# Patient Record
Sex: FEMALE | Race: WHITE | NOT HISPANIC OR LATINO
[De-identification: names, ages, dates, MRNs, and addresses within clinical notes are randomized per-mention and may not be internally consistent; named-entity substitution may affect disease eponyms.]

---

## 2018-10-25 ENCOUNTER — APPOINTMENT (OUTPATIENT)
Dept: VASCULAR SURGERY | Facility: CLINIC | Age: 53
End: 2018-10-25
Payer: COMMERCIAL

## 2018-10-25 VITALS
WEIGHT: 245 LBS | SYSTOLIC BLOOD PRESSURE: 136 MMHG | HEIGHT: 65 IN | TEMPERATURE: 98.2 F | HEART RATE: 74 BPM | DIASTOLIC BLOOD PRESSURE: 84 MMHG | BODY MASS INDEX: 40.82 KG/M2

## 2018-10-25 VITALS — SYSTOLIC BLOOD PRESSURE: 112 MMHG | HEART RATE: 91 BPM | DIASTOLIC BLOOD PRESSURE: 76 MMHG

## 2018-10-25 PROCEDURE — 93970 EXTREMITY STUDY: CPT

## 2018-10-25 PROCEDURE — 99203 OFFICE O/P NEW LOW 30 MIN: CPT

## 2019-02-07 ENCOUNTER — APPOINTMENT (OUTPATIENT)
Dept: VASCULAR SURGERY | Facility: CLINIC | Age: 54
End: 2019-02-07

## 2019-05-07 ENCOUNTER — APPOINTMENT (OUTPATIENT)
Dept: GASTROENTEROLOGY | Facility: CLINIC | Age: 54
End: 2019-05-07
Payer: COMMERCIAL

## 2019-05-07 VITALS
BODY MASS INDEX: 41.82 KG/M2 | HEIGHT: 65 IN | RESPIRATION RATE: 16 BRPM | OXYGEN SATURATION: 98 % | DIASTOLIC BLOOD PRESSURE: 72 MMHG | HEART RATE: 77 BPM | TEMPERATURE: 98.4 F | SYSTOLIC BLOOD PRESSURE: 110 MMHG | WEIGHT: 251 LBS

## 2019-05-07 PROCEDURE — 99204 OFFICE O/P NEW MOD 45 MIN: CPT

## 2019-05-07 NOTE — HISTORY OF PRESENT ILLNESS
[FreeTextEntry1] : 52 yo obese female pmh asthma presenting for discussion of dysphagia/abdominal pain and CRC Screening.\par \par Dysphagia/Abdominal Pain:\par This has been going on for about 1 year\par Has actually been improving her symptoms\par Has decreased coffee/caffeine amount - which did help\par Dysphagia - occurs about 2x/week at most; down to every two weeks\par This occurs with solids and liquids\par Heartburn - minimal now (previously was daily)\par Regurgitation - at least weekly\par No chest pain\par No food impactions\par No weight loss\par + Epigastric discomfort\par \par Triggers: hot sauce, chocolate, spaghetti\par Caffeine - currently 1 cup tea about 2x/day; rare coffee\par No tobacco\par No etoh\par \par Never had upper endoscopy\par \par Meds: has taken tums (on regular basis)\par Has not tried PPI or H2 blocker\par \par \par CRC Screening\par Never had screening for colonoscopy\par No blood in stool\par No melena\par \par HCV Screening - due for age related screening

## 2019-05-07 NOTE — REVIEW OF SYSTEMS
[Fever] : no fever [Chills] : no chills [Feeling Poorly] : not feeling poorly [Eye Pain] : no eye pain [Nosebleeds] : no nosebleeds [Eyesight Problems] : no eyesight problems [Nasal Discharge] : no nasal discharge [Sore Throat] : no sore throat [Heart Rate Is Fast] : the heart rate was not fast [Chest Pain] : no chest pain [Shortness Of Breath] : no shortness of breath [Palpitations] : no palpitations [Wheezing] : no wheezing [Cough] : no cough [As Noted in HPI] : as noted in HPI [Pelvic Pain] : no pelvic pain [Dysuria] : no dysuria [Incontinence] : no incontinence [Limb Swelling] : limb swelling [Joint Swelling] : no joint swelling [Joint Stiffness] : no joint stiffness [Arthralgias] : no arthralgias [Itching] : no itching [Skin Wound] : no skin wound [Skin Lesions] : no skin lesions [Confused] : no confusion [Dizziness] : no dizziness [Convulsions] : no convulsions [Anxiety] : no anxiety [Depression] : no depression [Muscle Weakness] : no muscle weakness [Easy Bruising] : no tendency for easy bruising [Easy Bleeding] : no tendency for easy bleeding [Feelings Of Weakness] : no feelings of weakness [Swollen Glands In The Neck] : no swollen glands in the neck [Swollen Glands] : no swollen glands

## 2019-05-07 NOTE — PHYSICAL EXAM
[General Appearance - Alert] : alert [General Appearance - Well-Appearing] : healthy appearing [General Appearance - In No Acute Distress] : in no acute distress [Outer Ear] : the ears and nose were normal in appearance [Sclera] : the sclera and conjunctiva were normal [Extraocular Movements] : extraocular movements were intact [Neck Appearance] : the appearance of the neck was normal [Oropharynx] : the oropharynx was normal [Examination Of The Oral Cavity] : the lips and gums were normal [Neck Cervical Mass (___cm)] : no neck mass was observed [Exaggerated Use Of Accessory Muscles For Inspiration] : no accessory muscle use [Respiration, Rhythm And Depth] : normal respiratory rhythm and effort [Auscultation Breath Sounds / Voice Sounds] : lungs were clear to auscultation bilaterally [Heart Sounds] : normal S1 and S2 [Heart Rate And Rhythm] : heart rate was normal and rhythm regular [Murmurs] : no murmurs [Abdomen Soft] : soft [Bowel Sounds] : normal bowel sounds [Edema] : there was no peripheral edema [Abdomen Tenderness] : non-tender [Abdomen Mass (___ Cm)] : no abdominal mass palpated [Cervical Lymph Nodes Enlarged Posterior Bilaterally] : posterior cervical [No CVA Tenderness] : no ~M costovertebral angle tenderness [Cervical Lymph Nodes Enlarged Anterior Bilaterally] : anterior cervical [Supraclavicular Lymph Nodes Enlarged Bilaterally] : supraclavicular [Involuntary Movements] : no involuntary movements were seen [No Spinal Tenderness] : no spinal tenderness [Abnormal Walk] : normal gait [] : no rash [FreeTextEntry1] : aox3 [No Focal Deficits] : no focal deficits [Impaired Insight] : insight and judgment were intact [Affect] : the affect was normal

## 2019-05-07 NOTE — ASSESSMENT
[FreeTextEntry1] : 54 yo obese female pmh asthma presenting for discussion of dysphagia/abdominal pain and CRC Screening.  She appears to have GERD with ? dysphagia.  Will start PPI and pursue endoscopic evaluation.  She has already ahd improvement in decreasing caffeine  She is due for CRC Screening..We had an extended discussion regarding colon cancer screening discussing multiple modalities including FOBT/IHC testing, CT Colonography, and Colonoscopy.  After a full discussion of risks, benefits, and limitations of each potential screening modality, the patient has elected to pursue colonoscopy.  Lastly she is due for age related HCV Screening.\par \par Impression:\par 1) GERD\par 2) Dysphagia\par 3) Avg risk CRC\par 4) Due for HCV screening\par 5) Obesity\par \par Plan\par 1) EGD/Colonoscopy with MoviPrep.  Risks, benefits, and limitations of procedure discussed at length specifically including risk of bleeding, infection, anesthesia complications, missed lesions, and perforation.  Prep instructions reviewed, written instructions provided, and prep ordered.\par 2) Start PPI daily 30 min before breakfast\par 3) Lifestyle modifications for GERD:\par - Weight loss\par - Avoid trigger foods for patient\par - Elevate head of bed\par \par 3) HCV ab\par 4) All discussed at length.  All questions answered.  Plan agreed upon\par 5) RV pending above

## 2019-05-09 LAB
HCV AB SER QL: NONREACTIVE
HCV S/CO RATIO: 0.32 S/CO

## 2019-06-10 ENCOUNTER — OTHER (OUTPATIENT)
Age: 54
End: 2019-06-10

## 2019-06-24 ENCOUNTER — OTHER (OUTPATIENT)
Age: 54
End: 2019-06-24

## 2019-06-26 ENCOUNTER — APPOINTMENT (OUTPATIENT)
Dept: GASTROENTEROLOGY | Facility: HOSPITAL | Age: 54
End: 2019-06-26

## 2019-06-26 ENCOUNTER — OUTPATIENT (OUTPATIENT)
Dept: OUTPATIENT SERVICES | Facility: HOSPITAL | Age: 54
LOS: 1 days | End: 2019-06-26
Payer: COMMERCIAL

## 2019-06-26 ENCOUNTER — RESULT REVIEW (OUTPATIENT)
Age: 54
End: 2019-06-26

## 2019-06-26 DIAGNOSIS — K21.9 GASTRO-ESOPHAGEAL REFLUX DISEASE WITHOUT ESOPHAGITIS: ICD-10-CM

## 2019-06-26 DIAGNOSIS — Z12.11 ENCOUNTER FOR SCREENING FOR MALIGNANT NEOPLASM OF COLON: ICD-10-CM

## 2019-06-26 DIAGNOSIS — R13.10 DYSPHAGIA, UNSPECIFIED: ICD-10-CM

## 2019-06-26 PROCEDURE — 88312 SPECIAL STAINS GROUP 1: CPT | Mod: 26

## 2019-06-26 PROCEDURE — 88305 TISSUE EXAM BY PATHOLOGIST: CPT | Mod: 26

## 2019-06-26 PROCEDURE — 43239 EGD BIOPSY SINGLE/MULTIPLE: CPT

## 2019-06-26 PROCEDURE — 45378 DIAGNOSTIC COLONOSCOPY: CPT

## 2019-06-28 PROCEDURE — 88312 SPECIAL STAINS GROUP 1: CPT

## 2019-06-28 PROCEDURE — 45380 COLONOSCOPY AND BIOPSY: CPT | Mod: PT

## 2019-06-28 PROCEDURE — 88305 TISSUE EXAM BY PATHOLOGIST: CPT

## 2019-06-28 PROCEDURE — 43239 EGD BIOPSY SINGLE/MULTIPLE: CPT

## 2019-07-01 ENCOUNTER — OTHER (OUTPATIENT)
Age: 54
End: 2019-07-01

## 2019-07-24 ENCOUNTER — OTHER (OUTPATIENT)
Age: 54
End: 2019-07-24

## 2019-08-26 ENCOUNTER — OTHER (OUTPATIENT)
Age: 54
End: 2019-08-26

## 2019-08-27 ENCOUNTER — APPOINTMENT (OUTPATIENT)
Dept: GASTROENTEROLOGY | Facility: CLINIC | Age: 54
End: 2019-08-27
Payer: COMMERCIAL

## 2019-08-27 VITALS
TEMPERATURE: 98.5 F | HEIGHT: 64 IN | WEIGHT: 265 LBS | OXYGEN SATURATION: 98 % | SYSTOLIC BLOOD PRESSURE: 126 MMHG | HEART RATE: 93 BPM | DIASTOLIC BLOOD PRESSURE: 80 MMHG | BODY MASS INDEX: 45.24 KG/M2 | RESPIRATION RATE: 17 BRPM

## 2019-08-27 DIAGNOSIS — Z11.59 ENCOUNTER FOR SCREENING FOR OTHER VIRAL DISEASES: ICD-10-CM

## 2019-08-27 DIAGNOSIS — R13.10 DYSPHAGIA, UNSPECIFIED: ICD-10-CM

## 2019-08-27 DIAGNOSIS — Z86.39 PERSONAL HISTORY OF OTHER ENDOCRINE, NUTRITIONAL AND METABOLIC DISEASE: ICD-10-CM

## 2019-08-27 PROCEDURE — 99214 OFFICE O/P EST MOD 30 MIN: CPT

## 2019-08-28 NOTE — REVIEW OF SYSTEMS
[Feeling Poorly] : not feeling poorly [Feeling Tired] : not feeling tired [As Noted in HPI] : as noted in HPI [Limb Pain] : limb pain [Limb Swelling] : no limb swelling [Negative] : Heme/Lymph

## 2019-08-28 NOTE — ASSESSMENT
[FreeTextEntry1] : 55 yo obese female pmh asthma presenting for follow up.  She had a negative colonoscopy and is due in 10 years.  Her dysphagia and GERD have 100% resolved with PPI and she had a negative EGD with bx.  Overall she is doing well currently.  She is considering further interventions for weight loss and wishes a referral to the obesity/weight management center.  We will maintain her on a PPI for an additional 2 months and then begin weening off.  Her Gastric ulcers were superficial and don’t require repeat endoscopy at this time.\par \par Impression:\par 1) GERD - resolved\par 2) Dysphagia - resolved\par 3) Superficial gastric ulcer/erosion - HP negative\par 4) Avg risk for CRC - due 2029\par 5) Obesity\par 6) Sciatica\par \par Plan\par 1) Continue with PPI\par 2) Referral to Obesity/weight management center\par 3) Continue with diet and exercise changes for weight loss\par 4) She needs to establish care with a PCP for her sciatica - multiple names provided\par 5) All discussed at length. All questions answered. Plan agreed upon\par 6) RV pending above.

## 2019-08-28 NOTE — PHYSICAL EXAM
[General Appearance - Alert] : alert [General Appearance - In No Acute Distress] : in no acute distress [General Appearance - Well-Appearing] : healthy appearing [Sclera] : the sclera and conjunctiva were normal [Extraocular Movements] : extraocular movements were intact [Outer Ear] : the ears and nose were normal in appearance [Oropharynx] : the oropharynx was normal [Examination Of The Oral Cavity] : the lips and gums were normal [Neck Appearance] : the appearance of the neck was normal [Neck Cervical Mass (___cm)] : no neck mass was observed [Respiration, Rhythm And Depth] : normal respiratory rhythm and effort [Exaggerated Use Of Accessory Muscles For Inspiration] : no accessory muscle use [Heart Rate And Rhythm] : heart rate was normal and rhythm regular [Auscultation Breath Sounds / Voice Sounds] : lungs were clear to auscultation bilaterally [Murmurs] : no murmurs [Heart Sounds] : normal S1 and S2 [Edema] : there was no peripheral edema [Abdomen Soft] : soft [Bowel Sounds] : normal bowel sounds [Abdomen Tenderness] : non-tender [Abdomen Mass (___ Cm)] : no abdominal mass palpated [Cervical Lymph Nodes Enlarged Posterior Bilaterally] : posterior cervical [Cervical Lymph Nodes Enlarged Anterior Bilaterally] : anterior cervical [Supraclavicular Lymph Nodes Enlarged Bilaterally] : supraclavicular [No CVA Tenderness] : no ~M costovertebral angle tenderness [No Spinal Tenderness] : no spinal tenderness [Abnormal Walk] : normal gait [Involuntary Movements] : no involuntary movements were seen [] : no rash [No Focal Deficits] : no focal deficits [FreeTextEntry1] : aox3 [Impaired Insight] : insight and judgment were intact [Affect] : the affect was normal

## 2019-08-28 NOTE — HISTORY OF PRESENT ILLNESS
[FreeTextEntry1] : Follow up: 6/2019\par Plan after last visit:\par Esophageal dysphagia (787.20) (R13.10)\par Chronic GERD (530.81) (K21.9)\par Morbid obesity (278.01) (E66.01)\par Colon cancer screening (V76.51) (Z12.11)\par Need for hepatitis C screening test (V73.89) (Z11.59)\par \par 52 yo obese female pmh asthma presenting for discussion of dysphagia/abdominal pain and CRC Screening. She appears to have GERD with ? dysphagia. Will start PPI and pursue endoscopic evaluation. She has already had improvement in decreasing caffeine She is due for CRC Screening..We had an extended discussion regarding colon cancer screening discussing multiple modalities including FOBT/IHC testing, CT Colonography, and Colonoscopy. After a full discussion of risks, benefits, and limitations of each potential screening modality, the patient has elected to pursue colonoscopy. Lastly she is due for age related HCV Screening.\par \par Impression:\par 1) GERD\par 2) Dysphagia\par 3) Avg risk CRC\par 4) Due for HCV screening\par 5) Obesity\par \par Plan\par 1) EGD/Colonoscopy with MoviPrep. Risks, benefits, and limitations of procedure discussed at length specifically including risk of bleeding, infection, anesthesia complications, missed lesions, and perforation. Prep instructions reviewed, written instructions provided, and prep ordered.\par 2) Start PPI daily 30 min before breakfast\par 3) Lifestyle modifications for GERD:\par - Weight loss\par - Avoid trigger foods for patient\par - Elevate head of bed\par 3) HCV ab\par 4) All discussed at length. All questions answered. Plan agreed upon\par 5) RV pending above. \par ------------------------------------------------------------------------------\par \par Sciatica - has been acting up - trial of Nsaids and Gabapentins\par \par Dysphagia has fully resolved with PPI\par Abdominal Discomfort resolved\par \par Gastric ulcers - incidentally found, HP negative\par \par HCV - negative ab\par \par CRC Screening - negative, due in 2029\par \par \par -----------------------------------------------------------------------\par HCV Ab - negative\par \par EGD 6/26/2019\par La Class A\par 3 cm HH Hill grade III\par Gastric ulcers\par Bx taken\par Otherwise normal EGD\par \par Colon 6/2019\par Normal - repeat 10 years\par \par \par Pathology:\par Final Diagnosis\par 1. Stomach, endoscopic biopsy:\par - Gastric mucosa with chronic nonspecific gastritis and\par reactive gastropathy\par - Negative for H. pylori (Warthin-Starry stain)\par - Negative for intestinal metaplasia\par \par 2. Esophagus, endoscopic biopsy:\par - Gastroesophageal junctional mucosa with chronic\par nonspecific carditis\par - Negative for intestinal metaplasia\par - Esophageal squamous epithelium without evidence of\par intraepithelial eosinophil\par infiltration\par \par 3. Distal esophagus, endoscopic biopsy:\par - Esophageal squamous epithelium without evidence of\par intraepithelial eosinophil\par infiltration\par - No columnar epithelium present\par \par 4. Proximal/Mid esophagus, endoscopic biopsy:\par - Esophageal squamous epithelium without significant\par pathologic findings\par - No evidence of intraepithelial eosinophil infiltration

## 2019-10-04 ENCOUNTER — OTHER (OUTPATIENT)
Age: 54
End: 2019-10-04

## 2019-12-03 ENCOUNTER — APPOINTMENT (OUTPATIENT)
Dept: GASTROENTEROLOGY | Facility: CLINIC | Age: 54
End: 2019-12-03
Payer: COMMERCIAL

## 2019-12-03 VITALS
SYSTOLIC BLOOD PRESSURE: 140 MMHG | HEIGHT: 64 IN | DIASTOLIC BLOOD PRESSURE: 80 MMHG | TEMPERATURE: 98 F | RESPIRATION RATE: 16 BRPM | OXYGEN SATURATION: 98 % | BODY MASS INDEX: 45.24 KG/M2 | WEIGHT: 265 LBS | HEART RATE: 104 BPM

## 2019-12-03 DIAGNOSIS — Z87.19 PERSONAL HISTORY OF OTHER DISEASES OF THE DIGESTIVE SYSTEM: ICD-10-CM

## 2019-12-03 DIAGNOSIS — Z12.11 ENCOUNTER FOR SCREENING FOR MALIGNANT NEOPLASM OF COLON: ICD-10-CM

## 2019-12-03 PROCEDURE — 99213 OFFICE O/P EST LOW 20 MIN: CPT

## 2019-12-07 PROBLEM — Z87.19 HISTORY OF GASTRIC ULCER: Status: RESOLVED | Noted: 2019-08-28 | Resolved: 2019-12-07

## 2019-12-07 PROBLEM — Z12.11 COLON CANCER SCREENING: Status: RESOLVED | Noted: 2019-05-07 | Resolved: 2019-12-07

## 2019-12-07 NOTE — HISTORY OF PRESENT ILLNESS
[FreeTextEntry1] : Follow up: 8/2019\par Plan after last visit:\par Esophageal dysphagia (787.20) (R13.10)\par Chronic GERD (530.81) (K21.9)\par Colon cancer screening (V76.51) (Z12.11)\par History of obesity (V12.29) (Z86.39)\par Morbid obesity (278.01) (E66.01)\par Gastric ulcer (531.90) (K25.9)\par \par 53 yo obese female pmh asthma presenting for follow up. She had a negative colonoscopy and is due in 10 years. Her dysphagia and GERD have 100% resolved with PPI and she had a negative EGD with bx. Overall she is doing well currently. She is considering further interventions for weight loss and wishes a referral to the obesity/weight management center. We will maintain her on a PPI for an additional 2 months and then begin weening off. Her Gastric ulcers were superficial and don’t require repeat endoscopy at this time.\par \par Impression:\par 1) GERD - resolved\par 2) Dysphagia - resolved\par 3) Superficial gastric ulcer/erosion - HP negative\par 4) Avg risk for CRC - due 2029\par 5) Obesity\par 6) Sciatica\par \par Plan\par 1) Continue with PPI\par 2) Referral to Obesity/weight management center\par 3) Continue with diet and exercise changes for weight loss\par 4) She needs to establish care with a PCP for her sciatica - multiple names provided\par 5) All discussed at length. All questions answered. Plan agreed upon\par 6) RV pending above. \par ------------------------------------------------------------------\par GERD:\par Tried Weening off PPI - however unable to do that\par Felt 'weak and off' as well as 'belching' when came off PPI\par Back on daily PPI - now doing well\par \par Obesity:\par Wishes to potentially work with Dr Owusu at obesity center and/or consider bariatric procedure in future\par Will defer any further referrals at this time per her request

## 2019-12-07 NOTE — ASSESSMENT
[FreeTextEntry1] : 53 yo obese female pmh asthma, GERD presenting for follow up. She was unable to ween off PPI cold turkey and is looking to ween off more gradually.  Will plan for taper at this time.  Ultimately weight loss will be the best method to be able to maintain off medical therapy for her GERD.  She will hold off on referral at this time, but will consider in future.\par \par Impression:\par 1) GERD\par 2) Dysphagia - resolved\par 3) Superficial gastric ulcer/erosion - HP negative\par 4) Avg risk for CRC - due 2029\par 5) Obesity\par 6) Sciatica\par \par Plan\par 1) Continue with PPI.  Then ween down to PPI and H2 blocker alternating every other day.  Then down to H2 blocker daily (+/- 2nd dose); Then down to H2 blocker as needed\par - Reviewed at length with patient and provided written documentation of this plan\par 2) Referral to Obesity/weight management center - if she wishes\par 3) Continue with diet and exercise changes for weight loss\par 4) All discussed at length. All questions answered. Plan agreed upon\par 5) RV 3 months\par 6) Colon 2029

## 2019-12-07 NOTE — PHYSICAL EXAM
[General Appearance - Alert] : alert [General Appearance - Well-Appearing] : healthy appearing [General Appearance - In No Acute Distress] : in no acute distress [Sclera] : the sclera and conjunctiva were normal [Outer Ear] : the ears and nose were normal in appearance [Examination Of The Oral Cavity] : the lips and gums were normal [Extraocular Movements] : extraocular movements were intact [Neck Appearance] : the appearance of the neck was normal [Oropharynx] : the oropharynx was normal [Neck Cervical Mass (___cm)] : no neck mass was observed [Auscultation Breath Sounds / Voice Sounds] : lungs were clear to auscultation bilaterally [Respiration, Rhythm And Depth] : normal respiratory rhythm and effort [Exaggerated Use Of Accessory Muscles For Inspiration] : no accessory muscle use [Heart Sounds] : normal S1 and S2 [Murmurs] : no murmurs [Heart Rate And Rhythm] : heart rate was normal and rhythm regular [Bowel Sounds] : normal bowel sounds [Abdomen Soft] : soft [Edema] : there was no peripheral edema [Abdomen Tenderness] : non-tender [Abdomen Mass (___ Cm)] : no abdominal mass palpated [Abnormal Walk] : normal gait [Involuntary Movements] : no involuntary movements were seen [No Focal Deficits] : no focal deficits [] : no rash [Affect] : the affect was normal [FreeTextEntry1] : aox3 [Impaired Insight] : insight and judgment were intact

## 2020-02-19 ENCOUNTER — APPOINTMENT (OUTPATIENT)
Dept: ORTHOPEDIC SURGERY | Facility: CLINIC | Age: 55
End: 2020-02-19
Payer: COMMERCIAL

## 2020-02-19 VITALS
DIASTOLIC BLOOD PRESSURE: 84 MMHG | SYSTOLIC BLOOD PRESSURE: 139 MMHG | WEIGHT: 260 LBS | BODY MASS INDEX: 44.39 KG/M2 | HEIGHT: 64 IN | HEART RATE: 97 BPM

## 2020-02-19 DIAGNOSIS — M25.552 PAIN IN RIGHT HIP: ICD-10-CM

## 2020-02-19 DIAGNOSIS — Z87.39 PERSONAL HISTORY OF OTHER DISEASES OF THE MUSCULOSKELETAL SYSTEM AND CONNECTIVE TISSUE: ICD-10-CM

## 2020-02-19 DIAGNOSIS — M25.562 PAIN IN RIGHT KNEE: ICD-10-CM

## 2020-02-19 DIAGNOSIS — Z87.09 PERSONAL HISTORY OF OTHER DISEASES OF THE RESPIRATORY SYSTEM: ICD-10-CM

## 2020-02-19 DIAGNOSIS — M25.561 PAIN IN RIGHT KNEE: ICD-10-CM

## 2020-02-19 DIAGNOSIS — M25.551 PAIN IN RIGHT HIP: ICD-10-CM

## 2020-02-19 PROCEDURE — 73564 X-RAY EXAM KNEE 4 OR MORE: CPT | Mod: LT

## 2020-02-19 PROCEDURE — 72170 X-RAY EXAM OF PELVIS: CPT

## 2020-02-19 PROCEDURE — 99214 OFFICE O/P EST MOD 30 MIN: CPT

## 2020-02-19 NOTE — DISCUSSION/SUMMARY
[de-identified] : Patient is advised of her findings and showed her x-rays. She understands she has a arthritic changes specifically to her left knee which are likely causing her disability. Her radiating right sided  pain pattern is noted most likely emanating from her back. She is advised on physical therapy and taking NSAIDs and weight reduction strategies were discussed\par \par Any form of injections were declined by the patient.\par \par Will be reevaluated in 6-8 weeks to check her progress if she remains symptomatic.

## 2020-02-19 NOTE — HISTORY OF PRESENT ILLNESS
[Worsening] : worsening [8] : a maximum pain level of 8/10 [Walking] : walking [Constant] : ~He/She~ states the symptoms seem to be constant [Hip Movement] : worsened by hip movement [Rest] : relieved by rest [___ mths] : [unfilled] month(s) ago [de-identified] : Pt returns for pain in her bilateral legs. Pt is using a topical icy hot for the pain, 7/2019 was seen by her PCP rx'd.  Gabapentin was Pt is taking aleve for pain is helpful. [de-identified] : icy hot

## 2020-02-19 NOTE — PHYSICAL EXAM
[de-identified] : Physical examination discloses medial sided joint line tenderness to the left knee. Her motion is limited to 95° flexion beyond which is painful\par She is noted to have radicular pain radiating from her right buttock across her right hip and lateral thigh [de-identified] : X-rays of the pelvis disclose mild joint space narrowing otherwise nonspecific. Review of priorlumbar films from 2018 disclose multilevel degenerative disc space narrowing\par \par trace of the left knee discloses moderate to severe medial sided joint space narrowing with varus deformity

## 2020-04-29 ENCOUNTER — APPOINTMENT (OUTPATIENT)
Dept: ORTHOPEDIC SURGERY | Facility: CLINIC | Age: 55
End: 2020-04-29

## 2020-06-09 ENCOUNTER — APPOINTMENT (OUTPATIENT)
Dept: GASTROENTEROLOGY | Facility: CLINIC | Age: 55
End: 2020-06-09
Payer: MEDICAID

## 2020-06-09 ENCOUNTER — APPOINTMENT (OUTPATIENT)
Dept: GASTROENTEROLOGY | Facility: CLINIC | Age: 55
End: 2020-06-09

## 2020-06-09 DIAGNOSIS — U07.1 COVID-19: ICD-10-CM

## 2020-06-09 PROCEDURE — 99214 OFFICE O/P EST MOD 30 MIN: CPT | Mod: 95

## 2020-06-09 NOTE — ASSESSMENT
[FreeTextEntry1] : 55 yo obese female pmh asthma, GERD presenting for follow up. Weened down to daily PPI at this time and overall doing okay.  Found to have asymptomatic COVID on PCR and is being managed by PCP currently.  Will leave at current PPI dosing and then move to ween down in about 1 month.  Otherwise no big changes.\par \par \par Impression:\par 1) GERD - weening down\par 2) Dysphagia - resolved\par 3) Superficial gastric ulcer/erosion - HP negative\par 4) Avg risk for CRC - due 2029\par 5) Obesity\par 6) Sciatica\par \par Plan\par 1) Continue with PPI x 4 weeks. Then ween down to PPI and H2 blocker alternating every other day x 1 week. Then down to H2 blocker daily (+/- 2nd dose).  Will regroup at that time\par - Reviewed at length with patient.  She actively wrote down specific dates\par 2) Continue with diet and exercise changes for weight loss\par 3) All discussed at length. All questions answered. Plan agreed upon\par 4) RV 6 weeks\par 5) Colon 2029

## 2020-06-09 NOTE — PHYSICAL EXAM
[General Appearance - Alert] : alert [General Appearance - In No Acute Distress] : in no acute distress [Sclera] : the sclera and conjunctiva were normal [Strabismus] : no strabismus was seen [Examination Of The Oral Cavity] : the lips and gums were normal [Neck Appearance] : the appearance of the neck was normal [Oropharynx] : the oropharynx was normal [Neck Cervical Mass (___cm)] : no neck mass was observed [Respiration, Rhythm And Depth] : normal respiratory rhythm and effort [] : no respiratory distress [Heart Rate And Rhythm] : heart rate was normal and rhythm regular [Exaggerated Use Of Accessory Muscles For Inspiration] : no accessory muscle use [Heart Sounds] : normal S1 and S2 [Bowel Sounds] : normal bowel sounds [Abdomen Tenderness] : non-tender [Abdomen Soft] : soft [No Focal Deficits] : no focal deficits [FreeTextEntry1] : aox3 [Affect] : the affect was normal [Impaired Insight] : insight and judgment were intact

## 2020-06-09 NOTE — HISTORY OF PRESENT ILLNESS
[FreeTextEntry1] : Telehealth Visit:\par Patient located at home in [facility], and Physician located in Office\par Verbal consent obtained from patient after full discussion including limitations of telehealth appointment\par All questions re: telehealth and security answered.  Plan to proceed with telehealth appointment.\par ----------------------------------------------------------------------------------------------\par \par Last visit: 12/2019\par Chronic GERD (530.81) (K21.9)\par Morbid obesity (278.01) (E66.01)\par \par 55 yo obese female pmh asthma, GERD presenting for follow up. She was unable to ween off PPI cold turkey and is looking to ween off more gradually. Will plan for taper at this time. Ultimately weight loss will be the best method to be able to maintain off medical therapy for her GERD. She will hold off on referral at this time, but will consider in future.\par \par Impression:\par 1) GERD\par 2) Dysphagia - resolved\par 3) Superficial gastric ulcer/erosion - HP negative\par 4) Avg risk for CRC - due 2029\par 5) Obesity\par 6) Sciatica\par \par Plan\par 1) Continue with PPI. Then ween down to PPI and H2 blocker alternating every other day. Then down to H2 blocker daily (+/- 2nd dose); Then down to H2 blocker as needed\par - Reviewed at length with patient and provided written documentation of this plan\par 2) Referral to Obesity/weight management center - if she wishes\par 3) Continue with diet and exercise changes for weight loss\par 4) All discussed at length. All questions answered. Plan agreed upon\par 5) RV 3 months\par 6) Colon 2029\par -----------------------------------------------------------------------------\par \par COVID 19 +\par Incidentally found on screening test\par Worsening asthma and sinusitis on time - but otherwise doing okay\par Due for repeat test tomm - self quarantining now\par \par GERD:\par Still on Omeprazole  every day 20 mg\par Tried weening - but with covid, quarantine unable to fully ween off\par Wants to try weening in future\par Denies dysphagia, odynophagia, n/v\par

## 2020-06-18 PROBLEM — U07.1 COVID-19: Status: ACTIVE | Noted: 2020-06-09

## 2020-09-28 ENCOUNTER — RX RENEWAL (OUTPATIENT)
Age: 55
End: 2020-09-28

## 2020-10-28 ENCOUNTER — RX RENEWAL (OUTPATIENT)
Age: 55
End: 2020-10-28

## 2020-11-11 ENCOUNTER — RX RENEWAL (OUTPATIENT)
Age: 55
End: 2020-11-11

## 2021-03-23 ENCOUNTER — APPOINTMENT (OUTPATIENT)
Dept: GASTROENTEROLOGY | Facility: CLINIC | Age: 56
End: 2021-03-23
Payer: MEDICAID

## 2021-03-23 VITALS
HEART RATE: 81 BPM | RESPIRATION RATE: 16 BRPM | BODY MASS INDEX: 43.36 KG/M2 | WEIGHT: 254 LBS | OXYGEN SATURATION: 98 % | TEMPERATURE: 98.1 F | HEIGHT: 64 IN

## 2021-03-23 DIAGNOSIS — K80.20 CALCULUS OF GALLBLADDER W/OUT CHOLECYSTITIS W/OUT OBSTRUCTION: ICD-10-CM

## 2021-03-23 PROCEDURE — 99214 OFFICE O/P EST MOD 30 MIN: CPT

## 2021-03-23 PROCEDURE — 99072 ADDL SUPL MATRL&STAF TM PHE: CPT

## 2021-03-26 ENCOUNTER — RX RENEWAL (OUTPATIENT)
Age: 56
End: 2021-03-26

## 2021-03-26 PROBLEM — K80.20 CHOLELITHIASIS: Status: ACTIVE | Noted: 2021-03-26

## 2021-03-26 NOTE — HISTORY OF PRESENT ILLNESS
[FreeTextEntry1] : Follow up: 6/2020\par Plan after last visit:\par Chronic GERD (530.81) (K21.9)\par Morbid obesity (278.01) (E66.01)\par COVID-19 (079.89) (U07.1)\par \par 55 yo obese female pmh asthma, GERD presenting for follow up. Weened down to daily PPI at this time and overall doing okay. Found to have asymptomatic COVID on PCR and is being managed by PCP currently. Will leave at current PPI dosing and then move to ween down in about 1 month. Otherwise no big changes.\par \par \par Impression:\par 1) GERD - weening down\par 2) Dysphagia - resolved\par 3) Superficial gastric ulcer/erosion - HP negative\par 4) Avg risk for CRC - due 2029\par 5) Obesity\par 6) Sciatica\par \par Plan\par 1) Continue with PPI x 4 weeks. Then ween down to PPI and H2 blocker alternating every other day x 1 week. Then down to H2 blocker daily (+/- 2nd dose). Will regroup at that time\par - Reviewed at length with patient. She actively wrote down specific dates\par 2) Continue with diet and exercise changes for weight loss\par 3) All discussed at length. All questions answered. Plan agreed upon\par 4) RV 6 weeks\par 5) Colon 2029. \par -------------------------------------------------------------\par \par GERD:\par Was taking Cimetidine 600 BID -> was doing well\par However had difficulty getting refill, now on Pantoprazole 40 daily as her PCP sent this in\par Still with some symptoms, but was better H2 related\par No dysphagia, odynophagia, n/v currently\par \par Abd Discomfort:\par Intermittent discomfort -> prompted an u/s which showed below\par Not post prandial\par Not LUQ\par She has difficulty explaining when the symptoms\par May be associated with bowel movements\par Overall - its very rare and not severe\par \par Fatty Liver/Obesity\par Had some abnormal imaging with hepatomegaly, concern for NAFLD\par BMI 43\par Denies etoh use\par She is worried that her weight is impacting her overall health at this point\par She would like referral to weight loss team\par \par CRC Screening: Avg risk - 2029\par \par Prior Workup:\par U/s Findings 1/2021\par Enlarged liver\par Mildly enlarged spleen\par GB with cholelithiasis

## 2021-03-26 NOTE — ASSESSMENT
[FreeTextEntry1] : 56 yo obese female pmh asthma, h/o COVID, and GERD presenting for follow up. She had successfully weened down to H2RA, however when ran out had a recurrence of symptoms and PCP sent in PPI. She responds better to H2RA as well and is amenable to weening down.  Otherwise, she had abd discomfort that now has largely resolved but was found to have suspected NAFLD and cholelithiasis on imaging.  Spent extended time reviewing both and answering many questions.  Abd pain does not appear to be related to cholelithiasis given not associated with meals but rather with BM. Lastly she wishes to focus on weight loss at this time.\par \par Impression:\par 1) GERD - intermittent\par 2) Dysphagia - resolved\par 3) Superficial gastric ulcer/erosion - HP negative - presumed resolved\par 4) Obesity c/b reported Hepatomegaly on imaging and possible NAFLD\par 5) Avg risk for CRC - due 2029\par 6) Intermittent abd discomfort\par 7) Sciatica\par 8) ? altered bowel habits ?\par 9) anxiety\par 10) cholelithiasis\par \par Plan\par 1) Come off PPI at this time, switch back to H2RA -> will taper x 1 week.  Reviewed at length\par 2) Labs to assess hepatomegaly\par 3) Referral to Naun Owusu and team.  Continue with diet and exercise changes for weight loss\par 4) Colon 2029\par 5) Consider adding fiber to daily dietary regimen\par 6) All discussed at length. All questions answered. Plan agreed upon\par 7) RV 3 months

## 2021-03-26 NOTE — PHYSICAL EXAM
[General Appearance - Alert] : alert [General Appearance - In No Acute Distress] : in no acute distress [General Appearance - Well Nourished] : well nourished [Sclera] : the sclera and conjunctiva were normal [Strabismus] : no strabismus was seen [Outer Ear] : the ears and nose were normal in appearance [Hearing Threshold Finger Rub Not [place]] : hearing was normal [Examination Of The Oral Cavity] : the lips and gums were normal [Neck Appearance] : the appearance of the neck was normal [Neck Cervical Mass (___cm)] : no neck mass was observed [Respiration, Rhythm And Depth] : normal respiratory rhythm and effort [Exaggerated Use Of Accessory Muscles For Inspiration] : no accessory muscle use [Auscultation Breath Sounds / Voice Sounds] : lungs were clear to auscultation bilaterally [Heart Rate And Rhythm] : heart rate was normal and rhythm regular [Heart Sounds] : normal S1 and S2 [Edema] : there was no peripheral edema [Bowel Sounds] : normal bowel sounds [Abdomen Soft] : soft [Abdomen Tenderness] : non-tender [Cervical Lymph Nodes Enlarged Posterior Bilaterally] : posterior cervical [Cervical Lymph Nodes Enlarged Anterior Bilaterally] : anterior cervical [Supraclavicular Lymph Nodes Enlarged Bilaterally] : supraclavicular [Abnormal Walk] : normal gait [Involuntary Movements] : no involuntary movements were seen [] : no rash [No Focal Deficits] : no focal deficits [Impaired Insight] : insight and judgment were intact [Affect] : the affect was normal [FreeTextEntry1] : + anxious

## 2021-07-14 ENCOUNTER — OUTPATIENT (OUTPATIENT)
Dept: OUTPATIENT SERVICES | Facility: HOSPITAL | Age: 56
LOS: 1 days | End: 2021-07-14
Payer: MEDICAID

## 2021-07-14 ENCOUNTER — APPOINTMENT (OUTPATIENT)
Dept: ORTHOPEDIC SURGERY | Facility: CLINIC | Age: 56
End: 2021-07-14
Payer: MEDICAID

## 2021-07-14 ENCOUNTER — APPOINTMENT (OUTPATIENT)
Dept: ULTRASOUND IMAGING | Facility: IMAGING CENTER | Age: 56
End: 2021-07-14
Payer: MEDICAID

## 2021-07-14 VITALS
HEIGHT: 64 IN | SYSTOLIC BLOOD PRESSURE: 135 MMHG | OXYGEN SATURATION: 97 % | DIASTOLIC BLOOD PRESSURE: 84 MMHG | WEIGHT: 260 LBS | HEART RATE: 80 BPM | BODY MASS INDEX: 44.39 KG/M2

## 2021-07-14 DIAGNOSIS — Z00.8 ENCOUNTER FOR OTHER GENERAL EXAMINATION: ICD-10-CM

## 2021-07-14 DIAGNOSIS — M17.12 UNILATERAL PRIMARY OSTEOARTHRITIS, LEFT KNEE: ICD-10-CM

## 2021-07-14 PROCEDURE — 99214 OFFICE O/P EST MOD 30 MIN: CPT

## 2021-07-14 PROCEDURE — 99072 ADDL SUPL MATRL&STAF TM PHE: CPT

## 2021-07-14 PROCEDURE — 76536 US EXAM OF HEAD AND NECK: CPT

## 2021-07-14 PROCEDURE — 76536 US EXAM OF HEAD AND NECK: CPT | Mod: 26

## 2021-07-14 PROCEDURE — 73564 X-RAY EXAM KNEE 4 OR MORE: CPT | Mod: LT

## 2021-07-14 NOTE — PHYSICAL EXAM
[de-identified] : Physical examination of the patient's left lower extremity discloses lateral compartment tenderness to the left knee on palpation.  Range of motion is limited to 95 degrees flexion beyond which is painful.  Mild valgus deformity is noted\par \par Patient is noted to have bilateral lower extremity varicosities.  Negative Homans [de-identified] : X-rays taken of the left knee in AP lateral skyline and open notch views disclose moderate to severe degenerative arthritis involving lateral compartment joint space narrowing and valgus deformity

## 2021-07-14 NOTE — HISTORY OF PRESENT ILLNESS
[Worsening] : worsening [___ yrs] : [unfilled] year(s) ago [8] : a maximum pain level of 8/10 [Walking] : worsened by walking [de-identified] : Pt returns for follow up for her bilateral leg pain,  pt is pointing to her right buttock, pt states she has swelling ot the left leg and pain to the right leg, she has taken Aleve with  no real relief, pt has numbness to the bilateral lower extremities, Pt has not received physical therapy. Pt did not follow up with a back physician. Pt weight is 260lbs. [de-identified] : certain movements [de-identified] : medication Aleve

## 2021-07-14 NOTE — DISCUSSION/SUMMARY
[de-identified] : The patient was informed of her findings her initial complaint of back pain with radiating discomfort down her right lower extremity will be referred to Dr. Duffy.  As far as her left knee is concerned the patient will start diclofenac.  She declined physical therapy referral she was also advised on seeking further evaluation with us vascular specialist for assessment of her lower extremity venous conditions.  Lastly and perhaps most importantly the patient was advised again on weight reduction strategies and was offered referral to weight reduction counseling and bariatric specialists

## 2021-07-20 ENCOUNTER — APPOINTMENT (OUTPATIENT)
Dept: GASTROENTEROLOGY | Facility: CLINIC | Age: 56
End: 2021-07-20
Payer: MEDICAID

## 2021-07-20 VITALS
SYSTOLIC BLOOD PRESSURE: 125 MMHG | RESPIRATION RATE: 16 BRPM | HEIGHT: 64 IN | TEMPERATURE: 98 F | WEIGHT: 250 LBS | HEART RATE: 80 BPM | DIASTOLIC BLOOD PRESSURE: 80 MMHG | OXYGEN SATURATION: 98 % | BODY MASS INDEX: 42.68 KG/M2

## 2021-07-20 PROCEDURE — 99213 OFFICE O/P EST LOW 20 MIN: CPT

## 2021-07-26 ENCOUNTER — NON-APPOINTMENT (OUTPATIENT)
Age: 56
End: 2021-07-26

## 2021-07-26 LAB
25(OH)D3 SERPL-MCNC: 32.9 NG/ML
ALBUMIN SERPL ELPH-MCNC: 4.5 G/DL
ALP BLD-CCNC: 125 U/L
ALT SERPL-CCNC: 25 U/L
ANA SER IF-ACNC: NEGATIVE
ANION GAP SERPL CALC-SCNC: 13 MMOL/L
AST SERPL-CCNC: 11 U/L
BASOPHILS # BLD AUTO: 0.05 K/UL
BASOPHILS NFR BLD AUTO: 0.5 %
BILIRUB SERPL-MCNC: 0.3 MG/DL
BUN SERPL-MCNC: 15 MG/DL
CALCIUM SERPL-MCNC: 9.7 MG/DL
CHLORIDE SERPL-SCNC: 106 MMOL/L
CO2 SERPL-SCNC: 22 MMOL/L
CREAT SERPL-MCNC: 0.81 MG/DL
EOSINOPHIL # BLD AUTO: 0.01 K/UL
EOSINOPHIL NFR BLD AUTO: 0.1 %
GLUCOSE SERPL-MCNC: 89 MG/DL
HCT VFR BLD CALC: 41.5 %
HGB BLD-MCNC: 13.1 G/DL
IGA SER QL IEP: 199 MG/DL
IGG SER QL IEP: 1381 MG/DL
IGM SER QL IEP: 70 MG/DL
IMM GRANULOCYTES NFR BLD AUTO: 0.3 %
LYMPHOCYTES # BLD AUTO: 1.74 K/UL
LYMPHOCYTES NFR BLD AUTO: 16.7 %
MAN DIFF?: NORMAL
MCHC RBC-ENTMCNC: 31.6 GM/DL
MCHC RBC-ENTMCNC: 32.8 PG
MCV RBC AUTO: 104 FL
MITOCHONDRIA AB SER IF-ACNC: NORMAL
MONOCYTES # BLD AUTO: 0.55 K/UL
MONOCYTES NFR BLD AUTO: 5.3 %
NEUTROPHILS # BLD AUTO: 8.01 K/UL
NEUTROPHILS NFR BLD AUTO: 77.1 %
PLATELET # BLD AUTO: 134 K/UL
POTASSIUM SERPL-SCNC: 4.4 MMOL/L
PROT SERPL-MCNC: 7.6 G/DL
RBC # BLD: 3.99 M/UL
RBC # FLD: 13.6 %
SMOOTH MUSCLE AB SER QL IF: ABNORMAL
SODIUM SERPL-SCNC: 140 MMOL/L
TSH SERPL-ACNC: 1.62 UIU/ML
TTG IGA SER IA-ACNC: <1.2 U/ML
TTG IGA SER-ACNC: NEGATIVE
TTG IGG SER IA-ACNC: 3.2 U/ML
TTG IGG SER IA-ACNC: NEGATIVE
VIT B12 SERPL-MCNC: 967 PG/ML
WBC # FLD AUTO: 10.39 K/UL

## 2021-07-28 ENCOUNTER — APPOINTMENT (OUTPATIENT)
Dept: ORTHOPEDIC SURGERY | Facility: CLINIC | Age: 56
End: 2021-07-28
Payer: MEDICAID

## 2021-07-28 VITALS
DIASTOLIC BLOOD PRESSURE: 76 MMHG | WEIGHT: 256 LBS | HEIGHT: 64 IN | BODY MASS INDEX: 43.71 KG/M2 | HEART RATE: 78 BPM | SYSTOLIC BLOOD PRESSURE: 134 MMHG

## 2021-07-28 DIAGNOSIS — M54.16 RADICULOPATHY, LUMBAR REGION: ICD-10-CM

## 2021-07-28 PROCEDURE — 99072 ADDL SUPL MATRL&STAF TM PHE: CPT

## 2021-07-28 PROCEDURE — 99214 OFFICE O/P EST MOD 30 MIN: CPT

## 2021-07-28 PROCEDURE — 72110 X-RAY EXAM L-2 SPINE 4/>VWS: CPT

## 2021-07-28 NOTE — HISTORY OF PRESENT ILLNESS
[de-identified] : This is a 55-year-old female here today for evaluation of her low back and leg symptoms.  She has been dealing with the symptoms for months now.  Unfortunately have been acutely worsening.  She describes pain that goes down her posterior lateral thigh posterior lateral calf on her right side.  At this point she feels that this pain can interfere with her activities of daily living.  She also endorses episodes where she has severe numbness and tingling throughout her lower extremities.  These can be very debilitating symptoms for her.  She denies any bowel bladder issues.  She denies any saddle anesthesia.

## 2021-07-28 NOTE — PHYSICAL EXAM
[de-identified] : Lumbar Physical Exam\par \par Gait - Normal\par \par Station - Normal\par \par Sagittal balance - Normal\par \par Compensatory mechanism? - None\par \par Heel walk - Normal\par \par Toe walk - Normal\par \par Reflexes\par Patellar - normal\par Gastroc - normal\par Clonus - No\par \par Hip Exam - Normal\par \par Straight leg raise - none\par \par Pulses - 2+ dp/pt\par \par Range of motion - normal\par \par Sensation \par Sensation intact to light touch in L1, L2, L3, L4, L5 and S1 dermatomes bilaterally\par \par Motor\par 	IP	Quad	HS	TA	Gastroc	EHL\par Right	3+/5	5/5	5/5	5/5	5/5	5/5\par Left	4/5	5/5	5/5	5/5	5/5	5/5 [de-identified] : Lumbar radiographs\par Degenerative scoliosis noted\par No obvious instability on flexion-extension radiographs\par Disc degeneration noted

## 2021-07-30 NOTE — HISTORY OF PRESENT ILLNESS
[FreeTextEntry1] : Last visit 3/23/2021\par Morbid obesity (278.01) (E66.01)\par Hepatomegaly (789.1) (R16.0)\par NAFLD (nonalcoholic fatty liver disease) (571.8) (K76.0)\par Chronic GERD (530.81) (K21.9)\par Cholelithiasis (574.20) (K80.20)\par \par 54 yo obese female pmh asthma, h/o COVID, and GERD presenting for follow up. She had successfully weened down to H2RA, however when ran out had a recurrence of symptoms and PCP sent in PPI. She responds better to H2RA as well and is amenable to weening down. Otherwise, she had abd discomfort that now has largely resolved but was found to have suspected NAFLD and cholelithiasis on imaging. Spent extended time reviewing both and answering many questions. Abd pain does not appear to be related to cholelithiasis given not associated with meals but rather with BM. Lastly she wishes to focus on weight loss at this time.\par \par Impression:\par 1) GERD - intermittent\par 2) Dysphagia - resolved\par 3) Superficial gastric ulcer/erosion - HP negative - presumed resolved\par 4) Obesity c/b reported Hepatomegaly on imaging and possible NAFLD\par 5) Avg risk for CRC - due 2029\par 6) Intermittent abd discomfort\par 7) Sciatica\par 8) ? altered bowel habits ?\par 9) anxiety\par 10) cholelithiasis\par \par Plan\par 1) Come off PPI at this time, switch back to H2RA -> will taper x 1 week. Reviewed at length\par 2) Labs to assess hepatomegaly\par 3) Referral to Naun Owusu and team. Continue with diet and exercise changes for weight loss\par 4) Colon 2029\par 5) Consider adding fiber to daily dietary regimen\par 6) All discussed at length. All questions answered. Plan agreed upon\par 7) RV 3 months. \par ---------------------------------------------------------------\par \par Presents today for follow up\par - she got some labs done at PCP brining today, many were not complete\par - she reports being told - she has celiac disease and then started going GFD\par - Never went to obesity medicine clinic - says lost phone number\par - had recurrence of GERD x 1 day and went back to PPI.  again doing well\par \par Currently no overt GI symptoms\par GERD is controlled\par Denies dysphagia, n/v, abd pain, altered bowel habits\par She is tearful and frustrated through appointment - mostly about non GI issues (chronic back pain and leg pain)\par \par

## 2021-07-30 NOTE — PHYSICAL EXAM
[General Appearance - Alert] : alert [General Appearance - In No Acute Distress] : in no acute distress [General Appearance - Well Nourished] : well nourished [Sclera] : the sclera and conjunctiva were normal [Strabismus] : no strabismus was seen [Outer Ear] : the ears and nose were normal in appearance [Hearing Threshold Finger Rub Not [place]] : hearing was normal [Examination Of The Oral Cavity] : the lips and gums were normal [Neck Appearance] : the appearance of the neck was normal [Neck Cervical Mass (___cm)] : no neck mass was observed [Respiration, Rhythm And Depth] : normal respiratory rhythm and effort [Exaggerated Use Of Accessory Muscles For Inspiration] : no accessory muscle use [Auscultation Breath Sounds / Voice Sounds] : lungs were clear to auscultation bilaterally [Heart Rate And Rhythm] : heart rate was normal and rhythm regular [Heart Sounds] : normal S1 and S2 [Edema] : there was no peripheral edema [Bowel Sounds] : normal bowel sounds [Abdomen Soft] : soft [Abdomen Tenderness] : non-tender [Abnormal Walk] : normal gait [Involuntary Movements] : no involuntary movements were seen [] : no rash [No Focal Deficits] : no focal deficits [Impaired Insight] : insight and judgment were intact [Affect] : the affect was normal [FreeTextEntry1] : + anxious, tearful

## 2021-07-30 NOTE — ASSESSMENT
[FreeTextEntry1] : 54 yo obese female pmh asthma, chronic back pain with now radiating to leg pain, h/o COVID, and GERD presenting for follow up. GERD well controlled on PPI given restarted it after a few days of GERD.  Her abd pain is not active now.  We spent time reviewing the labs she brought to me.  her TTG IGG was borderline with a negative TTG IGA.  I explained that this is NOT suggestive of celiac disease but that it is incomplete testing.  We will repeat this testing.  I also explained that a GFD can help with IBS symptoms and abdominal discomfort regardless, so if she is avoiding gluten that is okay.  She is only 60% compliant or such on that diet.  Otherwise there are not much changes in plan since last visit as it wasn’t full enacted.  Encouraged her to f/u with PCP and [facility] team given her back and leg pain being the most significant illness affecting her QOL currently.\par \par Impression:\par 1) GERD - intermittent\par 2) Dysphagia - resolved\par 3) Superficial gastric ulcer/erosion - HP negative - presumed resolved\par 4) Obesity c/b reported Hepatomegaly on imaging and possible NAFLD\par 5) Avg risk for CRC - due 2029\par 6) Intermittent abd discomfort - improved\par 7) Sciatica - worsening\par 8) ? altered bowel habits ? - improved\par 9) anxiety\par 10) cholelithiasis\par 11) + TTG IGG\par \par Plan\par 1) Come off PPI at this time, switch back to H2RA -> will taper x 1 week. Reviewed at length the plan with her.  Reaffirmed that goal is get her onto only PRN meds if able to.  Reaffirmed that she may need some short PPI courses intermittently as well\par 2) Labs to assess hepatomegaly will be sent in office today\par 3) Recheck Celiac labs including IGG, IGA levels - reinforced to patient that I do not expect that she has celiac\par 4) Okay with GFD, though again noted #3 above\par 5) Referral to Naun Owusu and team. Continue with diet and exercise changes for weight loss\par 4) Colon 2029\par 6) Consider adding fiber to daily dietary regimen\par 7) All discussed at length. All questions answered. Plan agreed upon\par 8) RV 3 months. \par 9) Reassurance provided during visit

## 2021-08-25 ENCOUNTER — APPOINTMENT (OUTPATIENT)
Dept: HEPATOLOGY | Facility: CLINIC | Age: 56
End: 2021-08-25
Payer: MEDICAID

## 2021-08-25 VITALS
RESPIRATION RATE: 12 BRPM | HEIGHT: 64 IN | SYSTOLIC BLOOD PRESSURE: 174 MMHG | BODY MASS INDEX: 43.87 KG/M2 | TEMPERATURE: 98 F | WEIGHT: 257 LBS | HEART RATE: 82 BPM | OXYGEN SATURATION: 98 % | DIASTOLIC BLOOD PRESSURE: 72 MMHG

## 2021-08-25 DIAGNOSIS — R16.0 HEPATOMEGALY, NOT ELSEWHERE CLASSIFIED: ICD-10-CM

## 2021-08-25 PROCEDURE — 99214 OFFICE O/P EST MOD 30 MIN: CPT

## 2021-08-25 RX ORDER — CIMETIDINE 400 MG
400 TABLET ORAL
Qty: 60 | Refills: 3 | Status: COMPLETED | COMMUNITY
Start: 2020-06-10 | End: 2021-08-25

## 2021-08-25 RX ORDER — MELOXICAM 15 MG/1
15 TABLET ORAL
Qty: 30 | Refills: 1 | Status: DISCONTINUED | COMMUNITY
Start: 2020-02-19 | End: 2021-08-25

## 2021-08-25 RX ORDER — PANTOPRAZOLE 20 MG/1
20 TABLET, DELAYED RELEASE ORAL
Refills: 0 | Status: COMPLETED | COMMUNITY

## 2021-08-25 RX ORDER — ALBUTEROL SULFATE 2.5 MG/3ML
(2.5 MG/3ML) SOLUTION RESPIRATORY (INHALATION)
Refills: 0 | Status: ACTIVE | COMMUNITY
Start: 2021-08-25

## 2021-08-25 RX ORDER — OMEPRAZOLE 40 MG/1
40 CAPSULE, DELAYED RELEASE ORAL
Qty: 30 | Refills: 0 | Status: DISCONTINUED | COMMUNITY
Start: 2019-05-07 | End: 2021-08-25

## 2021-08-25 RX ORDER — OMEPRAZOLE 20 MG/1
20 CAPSULE, DELAYED RELEASE ORAL
Qty: 30 | Refills: 0 | Status: COMPLETED | COMMUNITY
Start: 2020-06-09 | End: 2021-08-25

## 2021-08-25 RX ORDER — FLUTICASONE PROPIONATE AND SALMETEROL 50; 500 UG/1; UG/1
500-50 POWDER RESPIRATORY (INHALATION)
Refills: 0 | Status: ACTIVE | COMMUNITY
Start: 2021-08-25

## 2021-08-25 RX ORDER — POLYETHYLENE GLYCOL 3350, SODIUM SULFATE, SODIUM CHLORIDE, POTASSIUM CHLORIDE, ASCORBIC ACID, SODIUM ASCORBATE 7.5-2.691G
100 KIT ORAL
Qty: 1 | Refills: 0 | Status: COMPLETED | COMMUNITY
Start: 2019-05-07 | End: 2021-08-25

## 2021-08-25 RX ORDER — MONTELUKAST SODIUM 10 MG/1
10 TABLET, FILM COATED ORAL
Refills: 0 | Status: ACTIVE | COMMUNITY
Start: 2021-08-25

## 2021-08-25 RX ORDER — CIMETIDINE 800 MG/1
800 TABLET, FILM COATED ORAL
Qty: 60 | Refills: 3 | Status: COMPLETED | COMMUNITY
Start: 2021-03-30 | End: 2021-08-25

## 2021-08-25 RX ORDER — CIMETIDINE 400 MG
400 TABLET ORAL
Qty: 60 | Refills: 3 | Status: COMPLETED | COMMUNITY
Start: 2020-06-09 | End: 2021-08-25

## 2021-08-25 RX ORDER — CIMETIDINE 300 MG/1
300 TABLET, FILM COATED ORAL
Refills: 0 | Status: ACTIVE | COMMUNITY

## 2021-08-26 LAB
A1AT SERPL-MCNC: 148 MG/DL
AFP-TM SERPL-MCNC: 3.1 NG/ML
ALBUMIN SERPL ELPH-MCNC: 4.2 G/DL
ALP BLD-CCNC: 112 U/L
ALT SERPL-CCNC: 23 U/L
ANION GAP SERPL CALC-SCNC: 13 MMOL/L
AST SERPL-CCNC: 12 U/L
BASOPHILS # BLD AUTO: 0.05 K/UL
BASOPHILS NFR BLD AUTO: 0.5 %
BILIRUB SERPL-MCNC: 0.2 MG/DL
BUN SERPL-MCNC: 17 MG/DL
CALCIUM SERPL-MCNC: 8.8 MG/DL
CHLORIDE SERPL-SCNC: 105 MMOL/L
CHOLEST SERPL-MCNC: 187 MG/DL
CO2 SERPL-SCNC: 20 MMOL/L
CREAT SERPL-MCNC: 0.72 MG/DL
DEPRECATED KAPPA LC FREE/LAMBDA SER: 4.98 RATIO
EOSINOPHIL # BLD AUTO: 0.01 K/UL
EOSINOPHIL NFR BLD AUTO: 0.1 %
ESTIMATED AVERAGE GLUCOSE: 105 MG/DL
FERRITIN SERPL-MCNC: 87 NG/ML
GLUCOSE SERPL-MCNC: 115 MG/DL
HAV IGM SER QL: NONREACTIVE
HBA1C MFR BLD HPLC: 5.3 %
HBV CORE IGM SER QL: NONREACTIVE
HBV SURFACE AB SER QL: NONREACTIVE
HCT VFR BLD CALC: 38.6 %
HCV AB SER QL: NONREACTIVE
HCV S/CO RATIO: 0.12 S/CO
HDLC SERPL-MCNC: 41 MG/DL
HGB BLD-MCNC: 12.5 G/DL
IGA SER QL IEP: 184 MG/DL
IGG SER QL IEP: 1273 MG/DL
IGM SER QL IEP: 66 MG/DL
IMM GRANULOCYTES NFR BLD AUTO: 0.4 %
INR PPP: 1.03 RATIO
IRON SATN MFR SERPL: 19 %
IRON SERPL-MCNC: 64 UG/DL
KAPPA LC CSF-MCNC: 0.47 MG/DL
KAPPA LC SERPL-MCNC: 2.34 MG/DL
LDLC SERPL CALC-MCNC: 113 MG/DL
LYMPHOCYTES # BLD AUTO: 1.91 K/UL
LYMPHOCYTES NFR BLD AUTO: 17.7 %
MAN DIFF?: NORMAL
MCHC RBC-ENTMCNC: 32.4 GM/DL
MCHC RBC-ENTMCNC: 33.1 PG
MCV RBC AUTO: 102.1 FL
MONOCYTES # BLD AUTO: 0.5 K/UL
MONOCYTES NFR BLD AUTO: 4.6 %
NEUTROPHILS # BLD AUTO: 8.3 K/UL
NEUTROPHILS NFR BLD AUTO: 76.7 %
NONHDLC SERPL-MCNC: 146 MG/DL
PLATELET # BLD AUTO: 131 K/UL
POTASSIUM SERPL-SCNC: 3.9 MMOL/L
PROT SERPL-MCNC: 6.9 G/DL
PT BLD: 12.2 SEC
RBC # BLD: 3.78 M/UL
RBC # FLD: 13.2 %
SODIUM SERPL-SCNC: 139 MMOL/L
TIBC SERPL-MCNC: 337 UG/DL
TRIGL SERPL-MCNC: 167 MG/DL
UIBC SERPL-MCNC: 273 UG/DL
WBC # FLD AUTO: 10.81 K/UL

## 2021-08-26 NOTE — HISTORY OF PRESENT ILLNESS
[de-identified] : Ms. Carrington is a 56 year old female with a history of GERD, Obesity (BMI 44), and Asthma. Referred by GI MD Lopez after imaging revealed suspected NAFLD and cholelithiasis. Presents today very emotional with moments of tearing when discussing her medical situations, pandemic and work constraints. No family hx of liver disease. Admits to a poor diet high in carbs and sodium, and states currently she is inactive with no plans to start an exercise regimen. Denies chest pain/palpitations, SOB, ab pain, n/v, melena/BRBPR, and jaundice. \par \par Social hx: Works as a  at a  for 6 years. Not  no children. Denies alcohol, smoking or ilicit drug usage. \par Weight hx: Heaviest 257lbs, were she has been for the last 2years, and leanest being 197-200lbs. Current BMI 44.\par \par Work up:\par -07/26/2021: BW-- bili 0.3, AST 11, ALT 25, ,\par -06/2019: COL-- WNL repeat 10 years. EGD-- LA grade A esophagitis, bx taken. 3cm hiatus hernia, Hill grade III. Non-bleeding gastric ulcers.\par \par ROS as below\par \par

## 2021-08-26 NOTE — PHYSICAL EXAM
[General Appearance - Alert] : alert [General Appearance - In No Acute Distress] : in no acute distress [General Appearance - Well Nourished] : well nourished [Neck Appearance] : the appearance of the neck was normal [Outer Ear] : the ears and nose were normal in appearance [Jugular Venous Distention Increased] : there was no jugular-venous distention [Respiration, Rhythm And Depth] : normal respiratory rhythm and effort [Auscultation Breath Sounds / Voice Sounds] : lungs were clear to auscultation bilaterally [Heart Rate And Rhythm] : heart rate was normal and rhythm regular [Heart Sounds] : normal S1 and S2 [Edema] : there was no peripheral edema [Bowel Sounds] : normal bowel sounds [Abdomen Soft] : soft [Abdomen Tenderness] : non-tender [] : no hepato-splenomegaly [Abdomen Mass (___ Cm)] : no abdominal mass palpated [No CVA Tenderness] : no ~M costovertebral angle tenderness [Abnormal Walk] : normal gait [Nail Clubbing] : no clubbing  or cyanosis of the fingernails [Oriented To Time, Place, And Person] : oriented to person, place, and time [Scleral Icterus] : No Scleral Icterus [Abdominal Bruit] : no abdominal bruit [Abdominal  Ascites] : no ascites [Jaundice] : No jaundice

## 2021-08-26 NOTE — REVIEW OF SYSTEMS
[Anxiety] : anxiety [Negative] : Heme/Lymph [Fever] : no fever [Chills] : no chills [Chest Pain] : no chest pain [Palpitations] : no palpitations [Shortness Of Breath] : no shortness of breath [Abdominal Pain] : no abdominal pain [Vomiting] : no vomiting [Diarrhea] : no diarrhea [Melena] : no melena [Dysuria] : no dysuria [Limb Swelling] : no limb swelling [Itching] : no itching [Confused] : no confusion [Dizziness] : no dizziness [Fainting] : no fainting

## 2021-08-26 NOTE — ASSESSMENT
[FreeTextEntry1] : Ms. Carrington is a 56 year old female with a history of GERD, Obesity (BMI 44), and Asthma. Referred by GI MD Lopez after imaging revealed suspected NAFLD and cholelithiasis.\par \par 1. NAFLD??\par -BW, US and Fibro to be completed ASAP. PT stated she had a US back in 03/2021, advised to retrive imaging and have faxed to office for review\par -BMI 44, with poor diet and no exercise activity, also prediabetic, central obesity and increased cholesterol--pt at risk for NAFLD \par - I have explained the best treatment for fatty liver is diet and exercise. \par -I have encouraged a gradual weight loss of at least 10%, pt referred to Christina Whitley for nutrition support. \par -I also advised fatty liver may develop into liver cancer with/without cirrhosis and therefore continued screening with labs and ab imaging is important. \par -I have encouraged a healthy lifestyle including exercising at least 3x times weekly and maintaining a diet low in carbohydrates, fat, sodium (<2gm daily) and cholesterol. \par -I have counseled pt on abstaining from alcohol and illicit drug use, avoid herbal and dietary supplements. Encouraged limit use of acetaminophen to <2gm per day and to avoid NSAIDS.    \par \par 2. HM\par -Covid--not completed\par -HAV/HBV -- will check status to discuss vaccination\par -ECOL--repeat COL 2029\par \par Plan:\par -RTC 1 month with Fibroscan\par -BW ASAP and US attempt to have report sent to office if unable to complete US ordered \par -weight loss via diet and exercise-In the past was referred to weight loss clinic but never f/u. Referred to nutritionist Christina Whitley\par -Pt expressed many concerns and became tearful during visit due to pandemic, work constraints and other health issues. Emailed [facility] for psychologist referral. \par \par \par

## 2021-08-27 ENCOUNTER — NON-APPOINTMENT (OUTPATIENT)
Age: 56
End: 2021-08-27

## 2021-08-27 LAB
MITOCHONDRIA AB SER IF-ACNC: NORMAL
SMOOTH MUSCLE AB SER QL IF: NORMAL

## 2021-08-30 LAB
ALP BLD-CCNC: 124 IU/L
ALP BONE CFR SERPL: 37 %
ALP INTEST CFR SERPL: 16 %
ALP LIVER CFR SERPL: 47 %
ANA SER IF-ACNC: NEGATIVE

## 2021-09-01 ENCOUNTER — APPOINTMENT (OUTPATIENT)
Dept: DERMATOLOGY | Facility: CLINIC | Age: 56
End: 2021-09-01
Payer: MEDICAID

## 2021-09-01 VITALS — WEIGHT: 156 LBS | BODY MASS INDEX: 26.63 KG/M2 | HEIGHT: 64 IN

## 2021-09-01 DIAGNOSIS — B07.8 OTHER VIRAL WARTS: ICD-10-CM

## 2021-09-01 DIAGNOSIS — R22.2 LOCALIZED SWELLING, MASS AND LUMP, TRUNK: ICD-10-CM

## 2021-09-01 DIAGNOSIS — L82.0 INFLAMED SEBORRHEIC KERATOSIS: ICD-10-CM

## 2021-09-01 PROCEDURE — 99203 OFFICE O/P NEW LOW 30 MIN: CPT | Mod: GC,25

## 2021-09-01 PROCEDURE — 17110 DESTRUCTION B9 LES UP TO 14: CPT | Mod: GC

## 2021-09-06 ENCOUNTER — APPOINTMENT (OUTPATIENT)
Dept: ULTRASOUND IMAGING | Facility: CLINIC | Age: 56
End: 2021-09-06
Payer: MEDICAID

## 2021-09-06 ENCOUNTER — OUTPATIENT (OUTPATIENT)
Dept: OUTPATIENT SERVICES | Facility: HOSPITAL | Age: 56
LOS: 1 days | End: 2021-09-06
Payer: MEDICAID

## 2021-09-06 DIAGNOSIS — Z00.8 ENCOUNTER FOR OTHER GENERAL EXAMINATION: ICD-10-CM

## 2021-09-06 DIAGNOSIS — R16.0 HEPATOMEGALY, NOT ELSEWHERE CLASSIFIED: ICD-10-CM

## 2021-09-06 PROCEDURE — 76700 US EXAM ABDOM COMPLETE: CPT | Mod: 26

## 2021-09-06 PROCEDURE — 76700 US EXAM ABDOM COMPLETE: CPT

## 2021-09-08 ENCOUNTER — NON-APPOINTMENT (OUTPATIENT)
Age: 56
End: 2021-09-08

## 2021-09-20 RX ORDER — DICLOFENAC SODIUM 75 MG/1
75 TABLET, DELAYED RELEASE ORAL
Qty: 60 | Refills: 0 | Status: ACTIVE | COMMUNITY
Start: 2021-09-20 | End: 1900-01-01

## 2021-10-04 ENCOUNTER — APPOINTMENT (OUTPATIENT)
Dept: HEPATOLOGY | Facility: CLINIC | Age: 56
End: 2021-10-04
Payer: MEDICAID

## 2021-10-04 VITALS — HEIGHT: 64 IN | RESPIRATION RATE: 12 BRPM | BODY MASS INDEX: 42.85 KG/M2 | WEIGHT: 251 LBS | TEMPERATURE: 98 F

## 2021-10-04 PROCEDURE — 99214 OFFICE O/P EST MOD 30 MIN: CPT

## 2021-10-04 PROCEDURE — 91200 LIVER ELASTOGRAPHY: CPT

## 2021-10-04 RX ORDER — PNV NO.95/FERROUS FUM/FOLIC AC 28MG-0.8MG
400 TABLET ORAL DAILY
Qty: 60 | Refills: 5 | Status: ACTIVE | COMMUNITY
Start: 2021-10-04 | End: 1900-01-01

## 2021-10-04 RX ORDER — DICLOFENAC SODIUM 75 MG/1
75 TABLET, DELAYED RELEASE ORAL
Qty: 60 | Refills: 0 | Status: DISCONTINUED | COMMUNITY
Start: 2021-07-14 | End: 2021-10-04

## 2021-10-04 RX ORDER — LORATADINE 10 MG/1
10 TABLET ORAL
Refills: 0 | Status: ACTIVE | COMMUNITY
Start: 2021-10-04

## 2021-10-04 NOTE — ASSESSMENT
[FreeTextEntry1] : Ms. Carrington is a 56 year old female with a history of GERD, Obesity (BMI 43), and Asthma. Presents today for f/u and completion of fibroscan. \par \par 1. NAFLD\par -US ab + hepatic steatosis with fibro revealing S3 and F0-1\par - I have explained the best treatment for fatty liver is diet and exercise. Started Vit E 800IU/QD. Maintain nutritionist appt on 11/10/2021. Discussed starting rybelsus, pt has nodules on thyroid and awaiting eval with endocrine prior to starting. \par -I have encouraged a gradual weight loss of at least 10%, BMI 43, pt lost 6 lbs since last visit. Maintains a poor diet and no exercise activity. \par -I also advised fatty liver may develop into liver cancer with/without cirrhosis and therefore continued screening with labs and ab imaging is important. \par -I have encouraged a healthy lifestyle including exercising at least 3x times weekly and maintaining a diet low in carbohydrates, fat, sodium (<2gm daily) and cholesterol. \par -I have counseled pt on abstaining from alcohol and illicit drug use, avoid herbal and dietary supplements. Encouraged limit use of acetaminophen to <2gm per day and to avoid NSAIDS.    \par \par 2. HM\par -Covid--not completed, encouraged to complete due to health conditions and working around young children.  \par -HAV/HBV -- will discuss vaccination at next visit \par -ECOL--repeat COL 2029, f/u with MD Lpoez \par \par Plan:\par -RTC 1 month with repeat BW\par -RX Vit E 800IU/QD\par -f/u with nutritionist on 11/10/2021 and Endo to eval thyroid nodules, pending rybelsus start \par -continued weight loss via diet changes and beginning an exercise regimen.\par

## 2021-10-04 NOTE — PHYSICAL EXAM
[General Appearance - Alert] : alert [General Appearance - In No Acute Distress] : in no acute distress [General Appearance - Well Nourished] : well nourished [Sclera] : the sclera and conjunctiva were normal [Outer Ear] : the ears and nose were normal in appearance [Neck Appearance] : the appearance of the neck was normal [Jugular Venous Distention Increased] : there was no jugular-venous distention [] : no respiratory distress [Heart Rate And Rhythm] : heart rate was normal and rhythm regular [Edema] : there was no peripheral edema [Abnormal Walk] : normal gait [Nail Clubbing] : no clubbing  or cyanosis of the fingernails [Oriented To Time, Place, And Person] : oriented to person, place, and time [Impaired Insight] : insight and judgment were intact [Scleral Icterus] : No Scleral Icterus [Jaundice] : No jaundice

## 2021-10-04 NOTE — HISTORY OF PRESENT ILLNESS
[Wt Loss ___ Lbs] : recent [unfilled] ~Upound(s) weight loss [de-identified] : Ms. Carrington is a 56 year old female with a history of GERD, Obesity (BMI 43), and Asthma. Presents today for f/u and completion of fibroscan. Maintains her position of feeling frustrated with her medical issues, the pandemic and work relationships. Stated her sister recently confessed to her that she too has NAFLD. Admits to continued poor diet high in carbs and sodium, but states she has increased fresh fruits/vegetables. Remains of low physical exercise activity. Denies chest pain/palpitations, SOB, ab pain, n/v, melena/BRBPR, and jaundice. \par \par Social hx: Works as a  at a  for 6 years. Not  no children. Denies alcohol, smoking or illicit drug usage. \par Weight hx: Heaviest 257 lbs, were she has been for the last 2 years, and leanest being 197-200 lbs. Current BMI 44.\par \par Work up:\par -10/04/2021: Fibroscan--  (S3) and E 4.6 kPa (F0-1)\par -09/06/2021: US ab--cholelithiasis, hepatomegaly, and hepatic steatosis \par -08/25/2021: BW--LFT's WNL, , Triglyceride 167, HDL 41, \par -07/26/2021: BW-- bili 0.3, AST 11, ALT 25, ,\par -06/2019: COL-- WNL repeat 10 years. EGD-- LA grade A esophagitis, bx taken. 3cm hiatus hernia, Hill grade III. Non-bleeding gastric ulcers.\par \par ROS as below\par

## 2021-11-10 ENCOUNTER — APPOINTMENT (OUTPATIENT)
Dept: BARIATRICS/WEIGHT MGMT | Facility: CLINIC | Age: 56
End: 2021-11-10

## 2021-12-17 ENCOUNTER — APPOINTMENT (OUTPATIENT)
Dept: HEPATOLOGY | Facility: CLINIC | Age: 56
End: 2021-12-17
Payer: MEDICAID

## 2021-12-17 ENCOUNTER — LABORATORY RESULT (OUTPATIENT)
Age: 56
End: 2021-12-17

## 2021-12-17 VITALS
WEIGHT: 251 LBS | TEMPERATURE: 97.5 F | HEART RATE: 87 BPM | RESPIRATION RATE: 14 BRPM | OXYGEN SATURATION: 99 % | BODY MASS INDEX: 42.85 KG/M2 | HEIGHT: 64 IN | DIASTOLIC BLOOD PRESSURE: 74 MMHG | SYSTOLIC BLOOD PRESSURE: 119 MMHG

## 2021-12-17 LAB
CHOLEST SERPL-MCNC: 188 MG/DL
HDLC SERPL-MCNC: 43 MG/DL
LDLC SERPL CALC-MCNC: 124 MG/DL
NONHDLC SERPL-MCNC: 145 MG/DL
TRIGL SERPL-MCNC: 105 MG/DL

## 2021-12-17 PROCEDURE — 99214 OFFICE O/P EST MOD 30 MIN: CPT

## 2021-12-17 NOTE — ASSESSMENT
[FreeTextEntry1] : Ms. Carrington is a 56 year old female with a history of GERD, Obesity (BMI 43), and Asthma. RTC after f/u with Endocrine regarding thyroid nodules (measuring 4.5 cm and in need of biopsy), will defer use of semaglutide until diagnosed. Weight remains the same, BMI 43.\par \par 1. NAFLD\par -US ab + hepatic steatosis with fibro revealing S3 and F0-1. Repeat BW to be completed ASAP\par - I have explained the best treatment for fatty liver is diet and exercise. Pt admits to inconsistent use of Vit E 800IU/QD. She cancelled nutritionist appt on 11/10/2021. Will defer semaglutide use at this time until she completes thyroid biopsy. \par -I have encouraged a gradual weight loss of at least 10%, BMI 43, weight remains the same since last visit. Maintains a poor diet and admits to no exercise activity. \par -I encouraged her to maintain a Mediterranean style diet, exercise at least 3 times a week for min of 30 mins, and to begin intermittent fasting of 14-16 hrs a day.  \par -I also advised fatty liver may develop into liver cancer with/without cirrhosis and therefore continued screening with labs and ab imaging is important. \par -I have counseled pt on abstaining from alcohol and illicit drug use, avoid herbal and dietary supplements. Encouraged limit use of acetaminophen to <2gm per day and to avoid NSAIDS.    \par \par 2. HM\par -Covid--not completed\par -HAV/HBV -- offered vaccination--pt unsure if we will be covered in her insurance plan next year. She will f/u regarding \par -COL--repeat COL 2029, f/u with MD Lopez \par \par Plan:\par -RTC 3 month with repeat BW\par -Continue consistent use of Vit E 800IU/QD\par -f/u with nutritionist \par -continued weight loss via diet changes, intermittent fasting and beginning an exercise regimen.\par -will revisit semaglutide usage after pt is diagnosed from thyroid biopsy \par

## 2021-12-17 NOTE — HISTORY OF PRESENT ILLNESS
[Wt Loss ___ Lbs] : recent [unfilled] ~Upound(s) weight loss [de-identified] : -12/17/2021 visit: RTC after f/u with Endocrine regarding thyroid nodules (measuring 4.5 cm and in need of biopsy), will defer use of semaglutide until diagnosed. Weight remains the same, BMI 43.\par \par -10/04/2021 visit: Ms. Carrington is a 56 year old female with a history of GERD, Obesity (BMI 43), and Asthma. Presents today for f/u and completion of fibroscan. Maintains her position of feeling frustrated with her medical issues, the pandemic and work relationships. Stated her sister recently confessed to her that she too has NAFLD. Admits to continued poor diet high in carbs and sodium, but states she has increased fresh fruits/vegetables. Remains of low physical exercise activity. Denies chest pain/palpitations, SOB, ab pain, n/v, melena/BRBPR, and jaundice. \par \par Social hx: Works as a  at a  for 6 years. Not  no children. Denies alcohol, smoking or illicit drug usage. \par Weight hx: Heaviest 257 lbs, were she has been for the last 2 years, and leanest being 197-200 lbs. Current BMI 44.\par \par Work up:\par -10/04/2021: Fibroscan--  (S3) and E 4.6 kPa (F0-1)\par -09/06/2021: US ab--cholelithiasis, hepatomegaly, and hepatic steatosis \par -08/25/2021: BW--LFT's WNL, , Triglyceride 167, HDL 41, \par -07/26/2021: BW-- bili 0.3, AST 11, ALT 25, ,\par -06/2019: COL-- WNL repeat 10 years. EGD-- LA grade A esophagitis, bx taken. 3cm hiatus hernia, Hill grade III. Non-bleeding gastric ulcers.\par \par ROS as below\par

## 2021-12-20 LAB
BASOPHILS # BLD AUTO: 0.04 K/UL
BASOPHILS NFR BLD AUTO: 0.4 %
EOSINOPHIL # BLD AUTO: 0.12 K/UL
EOSINOPHIL NFR BLD AUTO: 1.2 %
ESTIMATED AVERAGE GLUCOSE: 105 MG/DL
HBA1C MFR BLD HPLC: 5.3 %
HCT VFR BLD CALC: 41.1 %
HGB BLD-MCNC: 12.8 G/DL
IMM GRANULOCYTES NFR BLD AUTO: 0.3 %
INR PPP: 1 RATIO
LYMPHOCYTES # BLD AUTO: 1.64 K/UL
LYMPHOCYTES NFR BLD AUTO: 16.4 %
MAN DIFF?: NORMAL
MCHC RBC-ENTMCNC: 31.1 GM/DL
MCHC RBC-ENTMCNC: 32.6 PG
MCV RBC AUTO: 104.6 FL
MONOCYTES # BLD AUTO: 0.7 K/UL
MONOCYTES NFR BLD AUTO: 7 %
NEUTROPHILS # BLD AUTO: 7.45 K/UL
NEUTROPHILS NFR BLD AUTO: 74.7 %
PLATELET # BLD AUTO: 153 K/UL
PT BLD: 11.8 SEC
RBC # BLD: 3.93 M/UL
RBC # FLD: 13.2 %
WBC # FLD AUTO: 9.98 K/UL

## 2021-12-27 ENCOUNTER — NON-APPOINTMENT (OUTPATIENT)
Age: 56
End: 2021-12-27

## 2022-02-22 ENCOUNTER — APPOINTMENT (OUTPATIENT)
Dept: GASTROENTEROLOGY | Facility: CLINIC | Age: 57
End: 2022-02-22

## 2022-08-30 ENCOUNTER — APPOINTMENT (OUTPATIENT)
Dept: GASTROENTEROLOGY | Facility: CLINIC | Age: 57
End: 2022-08-30

## 2022-10-25 ENCOUNTER — APPOINTMENT (OUTPATIENT)
Dept: OTOLARYNGOLOGY | Facility: CLINIC | Age: 57
End: 2022-10-25

## 2022-10-25 VITALS — WEIGHT: 260 LBS | BODY MASS INDEX: 44.39 KG/M2 | HEIGHT: 64 IN

## 2022-10-25 DIAGNOSIS — R09.82 POSTNASAL DRIP: ICD-10-CM

## 2022-10-25 DIAGNOSIS — H92.01 OTALGIA, RIGHT EAR: ICD-10-CM

## 2022-10-25 DIAGNOSIS — J34.2 DEVIATED NASAL SEPTUM: ICD-10-CM

## 2022-10-25 DIAGNOSIS — M26.609 UNSPECIFIED TEMPOROMANDIBULAR JOINT DISORDER: ICD-10-CM

## 2022-10-25 DIAGNOSIS — R09.81 NASAL CONGESTION: ICD-10-CM

## 2022-10-25 PROCEDURE — 31231 NASAL ENDOSCOPY DX: CPT

## 2022-10-25 PROCEDURE — 99204 OFFICE O/P NEW MOD 45 MIN: CPT | Mod: 25

## 2022-10-31 PROBLEM — R09.81 CHRONIC NASAL CONGESTION: Status: ACTIVE | Noted: 2022-10-31

## 2022-10-31 PROBLEM — H92.01 RIGHT EAR PAIN: Status: ACTIVE | Noted: 2022-10-31

## 2022-10-31 PROBLEM — J34.2 NASAL SEPTAL DEVIATION: Status: ACTIVE | Noted: 2022-10-31

## 2022-10-31 PROBLEM — R09.82 PND (POST-NASAL DRIP): Status: ACTIVE | Noted: 2022-10-31

## 2022-10-31 PROBLEM — M26.609 TMJ DYSFUNCTION: Status: ACTIVE | Noted: 2022-10-31

## 2022-10-31 RX ORDER — FLUTICASONE PROPIONATE 50 UG/1
50 SPRAY, METERED NASAL DAILY
Qty: 1 | Refills: 3 | Status: ACTIVE | COMMUNITY
Start: 2022-10-31 | End: 1900-01-01

## 2022-10-31 RX ORDER — SOD CHLOR,BICARB/SQUEEZ BOTTLE
PACKET, WITH RINSE DEVICE NASAL
Qty: 1 | Refills: 3 | Status: ACTIVE | COMMUNITY
Start: 2022-10-31 | End: 1900-01-01

## 2022-10-31 NOTE — REVIEW OF SYSTEMS
[Post Nasal Drip] : post nasal drip [Ear Pain] : ear pain [Ear Itch] : ear itch [Nasal Congestion] : nasal congestion [Recurrent Sinus Infections] : recurrent sinus infections [Sinus Pressure] : sinus pressure [Negative] : Heme/Lymph [As Noted in HPI] : as noted in HPI [de-identified] : Right Ear

## 2022-10-31 NOTE — ASSESSMENT
[FreeTextEntry1] : 57Y F with nasal congestion 2/2 NSD, PND, and right otalgia 2/2 TMJ. Physical exam shows normal EAC, TM, and right TMJ tender to palpation. Nasal endoscopy shows S-shape NSD, moderate mucus production coating nasal cavity,  and Moderate Turbinates Hypertrophy \par \par Recommend\par PND\par -Discussed Post-nasal drip occurs when mucus from the nasal passages and sinuses drains into the throat. If the mucus becomes thick, post-nasal drip can cause problems such as a sore throat, laryngitis, a cough, bad breath, hoarseness, and sometimes wheezing.\par -Discussed the importance of rinsing the sinus before using nasal spray so that excess mucus lining the nasal cavity can be wash away so medication can penetration the nose.\par -Send to Pharmacy Flonase nasal spray\par -If Flonase spray is not effective can discuss additional other nasal sprays for treatment\par \par Nasal Congestion/Nasal septal deviation/Turbinate Hypertrophy\par -Discussed deviated septum occurs when the thin wall between your nasal passages is displaced to one side.When the nasal septum is off-center it makes one nasal passage smaller.\par -If a deviated septum is severe, it can block one side of the nose and reduce airflow, causing difficulty breathing. -The exposure of a deviated septum to the drying effect of airflow through the nose may sometimes contribute to crusting or bleeding in certain people.\par -Treatment of nasal obstruction includes nasal steroids to reduce the swelling.\par -If conservative medical management is not effective correction of the deviated septum through surgery may be needed.\par \par TMJ\par - Recommended applying moist heat or cold packs on areas of acute pain\par - Trial of Soft foods diet for 2 weeks during acute episodes\par - Trial of (NSAIDs), such as aspirin/ibuprofen if patient tolerates\par - Follow up Dentist for for .\par - Avoid extreme jaw movements.\par \par -Return to clinic in 2 months or sooner if new/worsen symptoms present\par

## 2022-10-31 NOTE — HISTORY OF PRESENT ILLNESS
[de-identified] : 57Y F present for PND for past 4-5 days\par Has received amoxicillin antibiotics in the past for "sinus problem" last used was 1 years ago\par Patient has tried Flonase as needed medication usually for 1 week intermittent with symptoms some relief \par Patient states has consistent nasal congestion for several months\par Denies SInus rinse\par Has not seen allergist\par Has History of GERD\par Right Ear otalgia for 2 weeks\par Patient denies any otorrhea, fevers, chills, recurrent sinus/ear infections\par

## 2022-10-31 NOTE — PHYSICAL EXAM
[Nasal Endoscopy Performed] : nasal endoscopy was performed, see procedure section for findings [Normal] : mucosa is normal [Midline] : trachea located in midline position [de-identified] : right tender to palpation

## 2022-11-22 ENCOUNTER — APPOINTMENT (OUTPATIENT)
Dept: GASTROENTEROLOGY | Facility: CLINIC | Age: 57
End: 2022-11-22

## 2022-11-22 DIAGNOSIS — K44.9 DIAPHRAGMATIC HERNIA W/OUT OBSTRUCTION OR GANGRENE: ICD-10-CM

## 2022-11-22 DIAGNOSIS — F32.A DEPRESSION, UNSPECIFIED: ICD-10-CM

## 2022-11-22 DIAGNOSIS — K21.9 DIAPHRAGMATIC HERNIA W/OUT OBSTRUCTION OR GANGRENE: ICD-10-CM

## 2022-11-22 PROCEDURE — 99214 OFFICE O/P EST MOD 30 MIN: CPT

## 2022-11-22 RX ORDER — NYSTATIN 100000 [USP'U]/G
100000 CREAM TOPICAL
Qty: 30 | Refills: 0 | Status: DISCONTINUED | COMMUNITY
Start: 2022-08-15

## 2022-11-22 RX ORDER — PANTOPRAZOLE SODIUM 20 MG/1
20 TABLET, DELAYED RELEASE ORAL DAILY
Refills: 0 | Status: DISCONTINUED | COMMUNITY
Start: 2021-08-25 | End: 2022-11-22

## 2022-11-22 RX ORDER — FLUTICASONE PROPIONATE AND SALMETEROL 250; 50 UG/1; UG/1
250-50 POWDER RESPIRATORY (INHALATION)
Qty: 60 | Refills: 0 | Status: DISCONTINUED | COMMUNITY
Start: 2022-09-26

## 2022-11-22 RX ORDER — CLOTRIMAZOLE AND BETAMETHASONE DIPROPIONATE 10; .5 MG/G; MG/G
1-0.05 CREAM TOPICAL
Qty: 45 | Refills: 0 | Status: DISCONTINUED | COMMUNITY
Start: 2022-09-01

## 2022-11-22 RX ORDER — ERGOCALCIFEROL 1.25 MG/1
1.25 MG CAPSULE, LIQUID FILLED ORAL
Qty: 4 | Refills: 0 | Status: DISCONTINUED | COMMUNITY
Start: 2022-04-19

## 2022-11-22 NOTE — HISTORY OF PRESENT ILLNESS
[FreeTextEntry1] : Last visit:\par Hepatomegaly (789.1) (R16.0)\par NAFLD (nonalcoholic fatty liver disease) (571.8) (K76.0)\par Morbid obesity (278.01) (E66.01)\par \par 56 yo obese female pmh asthma, chronic back pain with now radiating to leg pain, h/o COVID, and GERD presenting for follow up. GERD well controlled on PPI given restarted it after a few days of GERD. Her abd pain is not active now. We spent time reviewing the labs she brought to me. her TTG IGG was borderline with a negative TTG IGA. I explained that this is NOT suggestive of celiac disease but that it is incomplete testing. We will repeat this testing. I also explained that a GFD can help with IBS symptoms and abdominal discomfort regardless, so if she is avoiding gluten that is okay. She is only 60% compliant or such on that diet. Otherwise there are not much changes in plan since last visit as it wasn’t full enacted. Encouraged her to f/u with PCP and [facility] team given her back and leg pain being the most significant illness affecting her QOL currently.\par \par Impression:\par 1) GERD - intermittent\par 2) Dysphagia - resolved\par 3) Superficial gastric ulcer/erosion - HP negative - presumed resolved\par 4) Obesity c/b reported Hepatomegaly on imaging and possible NAFLD\par 5) Avg risk for CRC - due 2029\par 6) Intermittent abd discomfort - improved\par 7) Sciatica - worsening\par 8) ? altered bowel habits ? - improved\par 9) anxiety\par 10) cholelithiasis\par 11) + TTG IGG\par \par Plan\par 1) Come off PPI at this time, switch back to H2RA -> will taper x 1 week. Reviewed at length the plan with her. Reaffirmed that goal is get her onto only PRN meds if able to. Reaffirmed that she may need some short PPI courses intermittently as well\par 2) Labs to assess hepatomegaly will be sent in office today\par 3) Recheck Celiac labs including IGG, IGA levels - reinforced to patient that I do not expect that she has celiac\par 4) Okay with GFD, though again noted #3 above\par 5) Referral to Naun Owusu and team. Continue with diet and exercise changes for weight loss\par 4) Colon 2029\par 6) Consider adding fiber to daily dietary regimen\par 7) All discussed at length. All questions answered. Plan agreed upon\par 8) RV 3 months. \par 9) Reassurance provided during visit. \par -----------------------------------------------------------------------------------------\par \par Since last visit:\par - Did not go to Naun Owusu's team\par - Has followed with liver\par \par Currently:\par GERD has been more active\par There is some heartburn, belching - is having symptoms worsening over past few months (on and off)\par Has gained 20 lbs since June -> worsening symptoms\par No true regurgitation\par No dysphagia, odynophagia, n/v\par \par *Back on PPI daily with partial control\par \par CRC Screening: due in 2029\par Reinforced the role of colonoscopy given her concerns\par \par Depression/Obesity:\par Patient reports being a stress eater\par Feels sad about weight gain\par Doesn't feel motivated to be active\par Has less excitement about all active issues\par Has not addressed with with PCP\par No intention to harm self or others\par \par \par \par ----------------------------------------------------------------------------------------------------------\par \par ENT 10/25/2022\par Chronic nasal congestion (478.19) (R09.81)\par PND (post-nasal drip) (784.91) (R09.82)\par Right ear pain (388.70) (H92.01)\par TMJ dysfunction (524.60) (M26.609)\par Nasal septal deviation (470) (J34.2)\par \par 57Y F with nasal congestion 2/2 NSD, PND, and right otalgia 2/2 TMJ. Physical exam shows normal EAC, TM, and right TMJ tender to palpation. Nasal endoscopy shows S-shape NSD, moderate mucus production coating nasal cavity, and Moderate Turbinates Hypertrophy \par \par Recommend\par PND\par -Discussed Post-nasal drip occurs when mucus from the nasal passages and sinuses drains into the throat. If the mucus becomes thick, post-nasal drip can cause problems such as a sore throat, laryngitis, a cough, bad breath, hoarseness, and sometimes wheezing.\par -Discussed the importance of rinsing the sinus before using nasal spray so that excess mucus lining the nasal cavity can be wash away so medication can penetration the nose.\par -Send to Pharmacy Flonase nasal spray\par -If Flonase spray is not effective can discuss additional other nasal sprays for treatment\par \par Nasal Congestion/Nasal septal deviation/Turbinate Hypertrophy\par -Discussed deviated septum occurs when the thin wall between your nasal passages is displaced to one side.When the nasal septum is off-center it makes one nasal passage smaller.\par -If a deviated septum is severe, it can block one side of the nose and reduce airflow, causing difficulty breathing. -The exposure of a deviated septum to the drying effect of airflow through the nose may sometimes contribute to crusting or bleeding in certain people.\par -Treatment of nasal obstruction includes nasal steroids to reduce the swelling.\par -If conservative medical management is not effective correction of the deviated septum through surgery may be needed.\par \par TMJ\par - Recommended applying moist heat or cold packs on areas of acute pain\par - Trial of Soft foods diet for 2 weeks during acute episodes\par - Trial of (NSAIDs), such as aspirin/ibuprofen if patient tolerates\par - Follow up Dentist for for .\par - Avoid extreme jaw movements.\par \par -Return to clinic in 2 months or sooner if new/worsen symptoms present\par \par \par \par Hepatologist 12/2021\par NAFLD (nonalcoholic fatty liver disease) (571.8) (K76.0)\par Morbid obesity (278.01) (E66.01)\par \par Ms. Carrington is a 56 year old female with a history of GERD, Obesity (BMI 43), and Asthma. RTC after f/u with Endocrine regarding thyroid nodules (measuring 4.5 cm and in need of biopsy), will defer use of semaglutide until diagnosed. Weight remains the same, BMI 43.\par \par 1. NAFLD\par -US ab + hepatic steatosis with fibro revealing S3 and F0-1. Repeat BW to be completed ASAP\par - I have explained the best treatment for fatty liver is diet and exercise. Pt admits to inconsistent use of Vit E 800IU/QD. She cancelled nutritionist appt on 11/10/2021. Will defer semaglutide use at this time until she completes thyroid biopsy. \par -I have encouraged a gradual weight loss of at least 10%, BMI 43, weight remains the same since last visit. Maintains a poor diet and admits to no exercise activity. \par -I encouraged her to maintain a Mediterranean style diet, exercise at least 3 times a week for min of 30 mins, and to begin intermittent fasting of 14-16 hrs a day. \par -I also advised fatty liver may develop into liver cancer with/without cirrhosis and therefore continued screening with labs and ab imaging is important. \par -I have counseled pt on abstaining from alcohol and illicit drug use, avoid herbal and dietary supplements. Encouraged limit use of acetaminophen to <2gm per day and to avoid NSAIDS. \par \par 2. HM\par -Covid--not completed\par -HAV/HBV -- offered vaccination--pt unsure if we will be covered in her insurance plan next year. She will f/u regarding \par -COL--repeat COL 2029, f/u with MD Lopez \par \par Plan:\par -RTC 3 month with repeat BW\par -Continue consistent use of Vit E 800IU/QD\par -f/u with nutritionist \par -continued weight loss via diet changes, intermittent fasting and beginning an exercise regimen.\par -will revisit semaglutide usage after pt is diagnosed from thyroid biopsy \par \par  [de-identified] : Labs:\par TTG negative 6/2021

## 2022-11-22 NOTE — PHYSICAL EXAM
[Alert] : alert [Normal Voice/Communication] : normal voice/communication [Healthy Appearing] : healthy appearing [No Acute Distress] : no acute distress [Sclera] : the sclera and conjunctiva were normal [Hearing Threshold Finger Rub Not [place]] : hearing was normal [Normal Lips/Gums] : the lips and gums were normal [Oropharynx] : the oropharynx was normal [Normal Appearance] : the appearance of the neck was normal [No Neck Mass] : no neck mass was observed [No Respiratory Distress] : no respiratory distress [No Acc Muscle Use] : no accessory muscle use [Respiration, Rhythm And Depth] : normal respiratory rhythm and effort [Auscultation Breath Sounds / Voice Sounds] : lungs were clear to auscultation bilaterally [Heart Rate And Rhythm] : heart rate was normal and rhythm regular [Normal S1, S2] : normal S1 and S2 [Murmurs] : no murmurs [Bowel Sounds] : normal bowel sounds [Abdomen Tenderness] : non-tender [No Masses] : no abdominal mass palpated [Abdomen Soft] : soft [] : no hepatosplenomegaly [No CVA Tenderness] : no CVA  tenderness [No Spinal Tenderness] : no spinal tenderness [Abnormal Walk] : normal gait [No Clubbing, Cyanosis] : no clubbing or cyanosis of the fingernails [No Joint Swelling] : no joint swelling seen [No Focal Deficits] : no focal deficits [Oriented To Time, Place, And Person] : oriented to person, place, and time [Normal Affect] : the affect was normal [Normal Mood] : the mood was normal

## 2022-11-22 NOTE — ASSESSMENT
[FreeTextEntry1] : 58 yo obese female pmh asthma, chronic back pain with now radiating to leg pain, h/o COVID, and HH c/b GERD presenting for follow up. She has additional weight gain of late and this correlates to a worsening of her GERD symptoms.  Appears depression playing a role with weight gain as well, and she wishes to speak to her PCP.  Ultimately will adjust GERD meds and if incomplete response, we may need to evaluate pH testing and/or consider hernia repair.  Given BMI, unclear if hernia repair would be appropriate option rather than rodrigue en y.\par \par Impression:\par 1) GERD - persistent\par 2) Dysphagia - resolved\par 3) Superficial gastric ulcer/erosion - HP negative - presumed resolved\par 4) Obesity c/b reported Hepatomegaly on imaging and possible NAFLD\par 5) Avg risk for CRC - due 2029\par 6) Intermittent abd discomfort - resolved\par 7) Sciatica - worsening\par 8) ? altered bowel habits ? - more constipation now\par 9) anxiety + depression\par 10) cholelithiasis\par 11) + TTG IGG\par \par Plan\par 1) PPI BID\par 2) Refer to PCP to discuss depression and weight gain.  She may benefit also from a GLP issue\par 3) Pending above, to consider pH testing\par 4) Lifestyle changes for GERD reviewed at length\par 5) c/w fiber\par 6) All discussed at length. All questions answered. Plan agreed upon\par 7) RPV 6-8 weeks

## 2022-11-23 ENCOUNTER — APPOINTMENT (OUTPATIENT)
Dept: OBGYN | Facility: CLINIC | Age: 57
End: 2022-11-23

## 2022-11-28 VITALS
WEIGHT: 270 LBS | BODY MASS INDEX: 46.1 KG/M2 | TEMPERATURE: 97.8 F | HEART RATE: 69 BPM | OXYGEN SATURATION: 98 % | SYSTOLIC BLOOD PRESSURE: 142 MMHG | DIASTOLIC BLOOD PRESSURE: 80 MMHG | HEIGHT: 64 IN

## 2022-12-02 ENCOUNTER — APPOINTMENT (OUTPATIENT)
Dept: OTOLARYNGOLOGY | Facility: CLINIC | Age: 57
End: 2022-12-02

## 2023-01-17 ENCOUNTER — APPOINTMENT (OUTPATIENT)
Dept: INTERNAL MEDICINE | Facility: CLINIC | Age: 58
End: 2023-01-17

## 2023-02-01 ENCOUNTER — LABORATORY RESULT (OUTPATIENT)
Age: 58
End: 2023-02-01

## 2023-02-01 ENCOUNTER — APPOINTMENT (OUTPATIENT)
Dept: OBGYN | Facility: CLINIC | Age: 58
End: 2023-02-01
Payer: MEDICAID

## 2023-02-01 ENCOUNTER — RESULT REVIEW (OUTPATIENT)
Age: 58
End: 2023-02-01

## 2023-02-01 ENCOUNTER — OUTPATIENT (OUTPATIENT)
Dept: OUTPATIENT SERVICES | Facility: HOSPITAL | Age: 58
LOS: 1 days | End: 2023-02-01
Payer: MEDICAID

## 2023-02-01 VITALS — SYSTOLIC BLOOD PRESSURE: 150 MMHG | BODY MASS INDEX: 46 KG/M2 | DIASTOLIC BLOOD PRESSURE: 90 MMHG | WEIGHT: 268 LBS

## 2023-02-01 DIAGNOSIS — N76.0 ACUTE VAGINITIS: ICD-10-CM

## 2023-02-01 PROCEDURE — 87624 HPV HI-RISK TYP POOLED RSLT: CPT

## 2023-02-01 PROCEDURE — 88175 CYTOPATH C/V AUTO FLUID REDO: CPT

## 2023-02-01 PROCEDURE — G0463: CPT

## 2023-02-01 PROCEDURE — 88141 CYTOPATH C/V INTERPRET: CPT

## 2023-02-01 PROCEDURE — 99203 OFFICE O/P NEW LOW 30 MIN: CPT | Mod: 25

## 2023-02-01 NOTE — DISCUSSION/SUMMARY
[FreeTextEntry1] : 56yo P0- new pt to establish care with PMB\par \par # PMB\par - unremarkable exam today/ no evidence of bleeding\par - [ ]pelvic sono ordered to assess lining\par - pt refused EMB today-   will bring back after pelvic sono and pretreat with cytotec (+/- paracerv block)\par \par # HCM\par - [  ]pap/ hpv \par - [ ]mammo rx\par - colonoscopy up to date\par \par RTC 2wks for EMB and to review pelvic sono\par discussed importance of follow up with pt to r/o pathology. \par Paola Cristobal, PAC

## 2023-02-01 NOTE — HISTORY OF PRESENT ILLNESS
[FreeTextEntry1] : 58yo G0 (LMP )  with h/o Obesity/gerd/anxiety/asthma/ h/o robot assist lap salpingectomy (endometrioma) presents to establish care for gyn exam.   Pt reports +PMB - episode 3/2021 (EMB done then- neg per pt)  3/2022 x1wk and again 2023 (off /on this month- scant).   Denies pain.  Last pelvic sono- early . Pt refused to have rpt bx with prior gyn without "numbing medication" \par \par Gynhx:  denies abnl paps/ stds/ fibroids.   +endometrioma  Last pap ?,  Last mammo- ?\par Pmhx:  obesity/gerd/anxiety/asthma/ pre-Dm/ HLD\par Shx: lap salpingoophorectomy (benign)\par ALL:  NKDA\par Sochx:  denies etoh/drugs.  +remote social smoker in her 20s.  Denies dv or abuse issues\par Famhx: mom-  of ?colon vs ovarian cancer,  maunt- ?stomach cancer  (pt unsure)\par \par Gen health followed by PCP (Jenny Richmond)\par - Colonoscopy- 2019 (rpt 10yrs)\par \par

## 2023-02-01 NOTE — PHYSICAL EXAM
[Appropriately responsive] : appropriately responsive [Alert] : alert [No Acute Distress] : no acute distress [No Lymphadenopathy] : no lymphadenopathy [No Murmurs] : no murmurs [Soft] : soft [Non-tender] : non-tender [Non-distended] : non-distended [No HSM] : No HSM [No Lesions] : no lesions [No Mass] : no mass [Oriented x3] : oriented x3 [Examination Of The Breasts] : a normal appearance [No Masses] : no breast masses were palpable [Labia Majora] : normal [Labia Minora] : normal [Atrophy] : atrophy [No Bleeding] : There was no active vaginal bleeding [Normal] : normal [Tenderness] : nontender [Uterine Adnexae] : normal [FreeTextEntry4] : (small/long spec used) [FreeTextEntry6] : limited bimanual 2/2 body habitus

## 2023-02-02 LAB — HPV HIGH+LOW RISK DNA PNL CVX: SIGNIFICANT CHANGE UP

## 2023-02-07 DIAGNOSIS — N95.0 POSTMENOPAUSAL BLEEDING: ICD-10-CM

## 2023-02-07 LAB — CYTOLOGY SPEC DOC CYTO: SIGNIFICANT CHANGE UP

## 2023-02-11 ENCOUNTER — OUTPATIENT (OUTPATIENT)
Dept: OUTPATIENT SERVICES | Facility: HOSPITAL | Age: 58
LOS: 1 days | End: 2023-02-11
Payer: MEDICAID

## 2023-02-11 ENCOUNTER — APPOINTMENT (OUTPATIENT)
Dept: ULTRASOUND IMAGING | Facility: CLINIC | Age: 58
End: 2023-02-11
Payer: MEDICAID

## 2023-02-11 DIAGNOSIS — N95.0 POSTMENOPAUSAL BLEEDING: ICD-10-CM

## 2023-02-11 PROCEDURE — 76856 US EXAM PELVIC COMPLETE: CPT

## 2023-02-11 PROCEDURE — 76830 TRANSVAGINAL US NON-OB: CPT | Mod: 26

## 2023-02-11 PROCEDURE — 76830 TRANSVAGINAL US NON-OB: CPT

## 2023-02-11 PROCEDURE — 76856 US EXAM PELVIC COMPLETE: CPT | Mod: 26

## 2023-03-07 ENCOUNTER — APPOINTMENT (OUTPATIENT)
Dept: GASTROENTEROLOGY | Facility: CLINIC | Age: 58
End: 2023-03-07
Payer: MEDICAID

## 2023-03-07 ENCOUNTER — LABORATORY RESULT (OUTPATIENT)
Age: 58
End: 2023-03-07

## 2023-03-07 ENCOUNTER — APPOINTMENT (OUTPATIENT)
Dept: OBGYN | Facility: CLINIC | Age: 58
End: 2023-03-07
Payer: MEDICAID

## 2023-03-07 ENCOUNTER — OUTPATIENT (OUTPATIENT)
Dept: OUTPATIENT SERVICES | Facility: HOSPITAL | Age: 58
LOS: 1 days | End: 2023-03-07
Payer: MEDICAID

## 2023-03-07 VITALS — SYSTOLIC BLOOD PRESSURE: 150 MMHG | BODY MASS INDEX: 46.52 KG/M2 | WEIGHT: 271 LBS | DIASTOLIC BLOOD PRESSURE: 88 MMHG

## 2023-03-07 VITALS
WEIGHT: 270 LBS | OXYGEN SATURATION: 98 % | BODY MASS INDEX: 46.1 KG/M2 | HEIGHT: 64 IN | TEMPERATURE: 97.9 F | HEART RATE: 71 BPM

## 2023-03-07 DIAGNOSIS — N95.0 POSTMENOPAUSAL BLEEDING: ICD-10-CM

## 2023-03-07 DIAGNOSIS — K21.9 GASTRO-ESOPHAGEAL REFLUX DISEASE W/OUT ESOPHAGITIS: ICD-10-CM

## 2023-03-07 DIAGNOSIS — K76.0 FATTY (CHANGE OF) LIVER, NOT ELSEWHERE CLASSIFIED: ICD-10-CM

## 2023-03-07 DIAGNOSIS — N76.0 ACUTE VAGINITIS: ICD-10-CM

## 2023-03-07 DIAGNOSIS — N84.0 POLYP OF CORPUS UTERI: ICD-10-CM

## 2023-03-07 DIAGNOSIS — K62.5 HEMORRHAGE OF ANUS AND RECTUM: ICD-10-CM

## 2023-03-07 PROCEDURE — 58100 BIOPSY OF UTERUS LINING: CPT | Mod: NC

## 2023-03-07 PROCEDURE — G0463: CPT

## 2023-03-07 PROCEDURE — 58100 BIOPSY OF UTERUS LINING: CPT

## 2023-03-07 PROCEDURE — 99214 OFFICE O/P EST MOD 30 MIN: CPT

## 2023-03-07 PROCEDURE — 99213 OFFICE O/P EST LOW 20 MIN: CPT | Mod: 25

## 2023-03-07 RX ORDER — PSYLLIUM SEED
48.57 PACKET (EA) ORAL
Qty: 1 | Refills: 2 | Status: ACTIVE | COMMUNITY
Start: 2023-03-07 | End: 1900-01-01

## 2023-03-07 NOTE — PLAN
[FreeTextEntry1] : 58yo G0 (LMP 2015) with h/o Obesity/gerd/anxiety/asthma/ h/o robot assist lap salpingectomy (endometrioma) presents for EMB for h/o PMB (since resolved)\par \par -Pt reported +PMB - episode 3/2021 (EMB done then- neg per pt) 3/2022 x1wk and again 1/2023 (off /on last month- scant).  Exam unremarkable/ no evidence of bleeding 2/1 or today\par - Pelvic sono reviewed:  2/1 EM 10mm\par - [ ] EMB sent for path\par \par Will call pt with result\par Pt aware she need to RTO for any further bleeding\par Paola Cirstobal, PAC\par

## 2023-03-07 NOTE — PROCEDURE
[Endometrial Biopsy] : Endometrial biopsy [Time out performed] : Pre-procedure time out performed.  Patient's name, date of birth and procedure confirmed. [Consent Obtained] : Consent obtained [Post-Menop. Bleeding] : post-menopausal bleeding [Risks] : risks [Alternatives] : alternatives [Patient] : patient [Infection] : infection [Bleeding] : bleeding [Allergic Reaction] : allergic reaction [Uterine Perforation] : uterine perforation [N/A] : pregnancy test not applicable [Cytotec] : Cytotec [Betadine] : Betadine [None] : none [Tenaculum] : Tenaculum [Easy Passage] : Easy passage [Sounded to ___ cm] : sounded to [unfilled] ~Ucm [Mid Position] : mid position [Scant] : scant [Specimen Collected] : collected [Sent to Pathology] : placed in buffered formalin and sent for pathology [Tolerated Well] : Patient tolerated the procedure well

## 2023-03-09 DIAGNOSIS — N95.0 POSTMENOPAUSAL BLEEDING: ICD-10-CM

## 2023-03-15 PROBLEM — N84.0 ENDOMETRIAL POLYP: Status: ACTIVE | Noted: 2023-03-15

## 2023-03-15 RX ORDER — MISOPROSTOL 200 UG/1
200 TABLET ORAL
Qty: 2 | Refills: 0 | Status: ACTIVE | COMMUNITY
Start: 2023-02-15 | End: 1900-01-01

## 2023-03-27 PROBLEM — K76.0 NAFLD (NONALCOHOLIC FATTY LIVER DISEASE): Status: ACTIVE | Noted: 2021-03-23

## 2023-03-27 PROBLEM — K21.9 CHRONIC GERD: Status: ACTIVE | Noted: 2019-05-07

## 2023-03-27 NOTE — PHYSICAL EXAM
[Alert] : alert [Normal Voice/Communication] : normal voice/communication [No Acute Distress] : no acute distress [Obese (BMI >= 30)] : obese (BMI >= 30) [Sclera] : the sclera and conjunctiva were normal [Hearing Threshold Finger Rub Not [place]] : hearing was normal [Normal Lips/Gums] : the lips and gums were normal [Oropharynx] : the oropharynx was normal [Normal Appearance] : the appearance of the neck was normal [No Neck Mass] : no neck mass was observed [No Respiratory Distress] : no respiratory distress [No Acc Muscle Use] : no accessory muscle use [Respiration, Rhythm And Depth] : normal respiratory rhythm and effort [Auscultation Breath Sounds / Voice Sounds] : lungs were clear to auscultation bilaterally [Heart Rate And Rhythm] : heart rate was normal and rhythm regular [Normal S1, S2] : normal S1 and S2 [Murmurs] : no murmurs [Bowel Sounds] : normal bowel sounds [Abdomen Tenderness] : non-tender [No Masses] : no abdominal mass palpated [Abdomen Soft] : soft [] : no hepatosplenomegaly [Abnormal Walk] : normal gait [No Clubbing, Cyanosis] : no clubbing or cyanosis of the fingernails [No Joint Swelling] : no joint swelling seen [No Focal Deficits] : no focal deficits [Oriented To Time, Place, And Person] : oriented to person, place, and time [Normal Affect] : the affect was normal [Normal Mood] : the mood was normal

## 2023-03-27 NOTE — HISTORY OF PRESENT ILLNESS
[FreeTextEntry1] : Last visit: 11/2022\par Plan after last visit:\par Hiatal hernia with GERD (553.3,530.81) (K44.9,K21.9)\par Chronic GERD (530.81) (K21.9)\par Morbid obesity (278.01) (E66.01)\par NAFLD (nonalcoholic fatty liver disease) (571.8) (K76.0)\par Depression, unspecified depression type (311) (F32.A)\par \par 58 yo obese female pmh asthma, chronic back pain with now radiating to leg pain, h/o COVID, and HH c/b GERD presenting for follow up. She has additional weight gain of late and this correlates to a worsening of her GERD symptoms. Appears depression playing a role with weight gain as well, and she wishes to speak to her PCP. Ultimately will adjust GERD meds and if incomplete response, we may need to evaluate pH testing and/or consider hernia repair. Given BMI, unclear if hernia repair would be appropriate option rather than rodrigue en y.\par \par Impression:\par 1) GERD - persistent\par 2) Dysphagia - resolved\par 3) Superficial gastric ulcer/erosion - HP negative - presumed resolved\par 4) Obesity c/b reported Hepatomegaly on imaging and possible NAFLD\par 5) Avg risk for CRC - due 2029\par 6) Intermittent abd discomfort - resolved\par 7) Sciatica - worsening\par 8) ? altered bowel habits ? - more constipation now\par 9) anxiety + depression\par 10) cholelithiasis\par 11) + TTG IGG\par \par Plan\par 1) PPI BID\par 2) Refer to PCP to discuss depression and weight gain. She may benefit also from a GLP agent\par 3) Pending above, to consider pH testing\par 4) Lifestyle changes for GERD reviewed at length\par 5) c/w fiber\par 6) All discussed at length. All questions answered. Plan agreed upon\par 7) RPV 6-8 weeks. \par --------------------------------------------------------------------------------------------------\par \par Since last visit:\par Had swelling after 2 months of PPI - was concerned it was PPI so stopped\par \par Currently:\par \par GERD with ? PPI Intolerance\par Feels like there is some swelling in the lower extremity (top of foot) when on PPI\par She still intermittently takes PPI\par Still taking Cimetidine\par Still with intermittent symptoms (more mild, but maybe daily)\par No dysphagia, odynophagia, regurgitation\par There is no more burping\par Hiccups have resolved as well\par \par Obesity:\par Has gained 10 lbs since around last visit\par Still struggling with weight gain, stress eating\par Has not seen PCP since last visit with me\par Has been changing around appointments and had been focused on other issues\par \par Endometrial bleeding vs. Rectal bleeding\par Has had issues with endometrial thickening and negative bx previously\par undergoing workup for abnormal bleeding with obgyn (awaiting bx results)\par feels unclear if she is having some blood on tissue when wiping\par no pain at anus, no melena\par negative colon 2019, no family history\par \par \par CRC Screening\par Negative colon 2019 - Due in 2029\par \par \par -------------------------------------------------------------------------------\par \par Prior consultations:\par \par ENT 10/2022\par Nasal Endoscopy (29530)\par \par After informed verbal consent, nasal endoscopy was performed due to anterior rhinoscopy insufficient to account for symptoms. [ Flexible ], scope # [205] was used. \par \par To visualize and biopsy intranasal masses or polyps, a foreign body, pathology at the osteomeatal complex, anterior anatomical abnormalities, evaluation of epistaxis, or post-operative debridement, nasal endoscopy is indicated. After discussion of the risks and benefits, and topical decongestion and anesthesia, the endoscopy was performed without complications.\par \par The endoscope was passed first via the right nasal cavity. The septum was examined. The inferior, middle and superior turbinates were examined. The inferior, middle and superior meati were examined. The osteomeatal complex and the sphenoethmoid recesses were examined, as was the choana. The same procedure was then performed on the left side. The findings of this procedure can be found below:\par \par S-shape DNS\par Moderate Inferior Turbinates Hypertrophy \par Middle Turbinates grossly [normal]\par Superior Turbinates grossly [Normal]\par Middle Meati clear\par Sphenoethmoid recess clear\par No polyps, purulence or masses\par Posterior Nasal pharynx Cobblestone/Clear Drainage\par No adenoid tissue nonobstructive\par Vocal Folds mobile, No gross lesions or mass noted\par Moderate mucus production coating nasal cavity \par

## 2023-03-27 NOTE — ASSESSMENT
[FreeTextEntry1] : 58 yo obese female pmh asthma, chronic back pain with now radiating to leg pain, h/o COVID, and HH c/b GERD presenting for follow up. Overall doing relatively well from GI standpoint.  Her GERD symptoms appear to worsen with her weight gain.  She is contemplative about weight loss strategies.  Her major concern is the bloody discharge.  There does appear to be some ? hemorrhoids, but appears to have endometrial lesion that is being worked up as well.  We can start with adding fiber given prior normal colonoscopy while pending these results.  If no endometrial lesion and vaginal bleeding not of concern, we may consider repeat colonoscopy given newness of symptoms.\par \par Impression:\par 1) GERD - persistent\par 2) Dysphagia - resolved\par 3) Superficial gastric ulcer/erosion - HP negative - presumed resolved\par 4) Obesity c/b reported Hepatomegaly on imaging and possible NAFLD\par 5) Avg risk for CRC - due 2029\par 6) Intermittent abd discomfort - resolved\par 7) Sciatica - worsening\par 8) ? altered bowel habits ? - more constipation now\par 9) anxiety + depression\par 10) cholelithiasis\par 11) + TTG IGG\par 12) Rectal vs. vaginal bleeding\par 13) ? Endometrial lesion ?\par \par \par Plan\par 1) PPI BID\par 2) Add on Fiber\par 3) Workup as per OB GYN\par 4) Pending above, to consider colonoscopy\par 5) Again we reviewed the importance of her addressing her weight.  She will consider going to Naun Owusu and team.  She reports interest in GLP agents\par 6) Pending above, to consider pH testing\par 7) All discussed at length. All questions answered. Plan agreed upon\par 8) RPV pending above\par

## 2023-04-03 DIAGNOSIS — N90.89 OTHER SPECIFIED NONINFLAMMATORY DISORDERS OF VULVA AND PERINEUM: ICD-10-CM

## 2023-04-03 RX ORDER — CLOTRIMAZOLE AND BETAMETHASONE DIPROPIONATE 10; .5 MG/G; MG/G
1-0.05 CREAM TOPICAL TWICE DAILY
Qty: 1 | Refills: 2 | Status: ACTIVE | COMMUNITY
Start: 2023-04-03 | End: 1900-01-01

## 2023-04-04 ENCOUNTER — APPOINTMENT (OUTPATIENT)
Dept: OBGYN | Facility: CLINIC | Age: 58
End: 2023-04-04

## 2023-07-07 ENCOUNTER — APPOINTMENT (OUTPATIENT)
Dept: ORTHOPEDIC SURGERY | Facility: CLINIC | Age: 58
End: 2023-07-07

## 2023-07-28 ENCOUNTER — RX RENEWAL (OUTPATIENT)
Age: 58
End: 2023-07-28

## 2023-07-28 RX ORDER — FAMOTIDINE 20 MG/1
20 TABLET, FILM COATED ORAL
Qty: 60 | Refills: 2 | Status: ACTIVE | COMMUNITY
Start: 2019-12-03 | End: 1900-01-01

## 2023-10-01 PROBLEM — M54.16 LUMBAR RADICULAR PAIN: Status: ACTIVE | Noted: 2020-02-19

## 2023-12-05 ENCOUNTER — APPOINTMENT (OUTPATIENT)
Dept: GASTROENTEROLOGY | Facility: CLINIC | Age: 58
End: 2023-12-05

## 2024-02-06 ENCOUNTER — APPOINTMENT (OUTPATIENT)
Dept: GASTROENTEROLOGY | Facility: CLINIC | Age: 59
End: 2024-02-06

## 2024-02-18 ENCOUNTER — NON-APPOINTMENT (OUTPATIENT)
Age: 59
End: 2024-02-18

## 2024-03-06 ENCOUNTER — RX RENEWAL (OUTPATIENT)
Age: 59
End: 2024-03-06

## 2024-03-06 RX ORDER — OMEPRAZOLE 40 MG/1
40 CAPSULE, DELAYED RELEASE ORAL
Qty: 60 | Refills: 5 | Status: ACTIVE | COMMUNITY
Start: 2022-11-22 | End: 1900-01-01

## 2024-04-11 NOTE — END OF VISIT
[facility]   Infusion Clinic Note   Date: 2024   Name: Susan Ford  : 1980   MRN: [id number]         Reason for Visit: OP Infusion (Simponi)         Visit Type: INFUSION       Ordered By: Dr Garcia      Accompanied by:      Diagnosis: Polyarthritis with negative rheumatoid factor (CMS/[facility])       Allergies:   Allergies as of 2024 - Reviewed 2024   Allergen Reaction Noted    Penicillin Shortness of breath 2023    Penicillins Shortness of breath, Anaphylaxis, and Swelling 10/29/2008    Bee pollen Unknown 01/10/2024    House dust Unknown 01/10/2024         Current Medications has a current medication list which includes the following prescription(s): albuterol, albuterol, benzonatate, budesonide, cyclobenzaprine, dupixent pen, epipen 2-mohamud, ergocalciferol, fexofenadine, fluticasone, folic acid, meclizine, dulera, oxycodone-acetaminophen, sulfasalazine, tramadol, and vienva, and the following Facility-Administered Medications: golimumab (Simponi Aria) 150 mg in sodium chloride 0.9% 100 mL IV.       Vitals:   Vitals:    24 1001   BP: 101/63   Pulse: 67   Resp: 16   Temp: 36.8 °C (98.3 °F)   TempSrc: Temporal   SpO2: 100%   Weight: 75.3 kg (166 lb)   PainSc:   6   PainLoc: Generalized             Infusion Pre-procedure Checklist:   - Allergies reviewed: yes   - Medications reviewed: yes       - Previous reaction to current treatment: no, patient has been on Simponi previously, this is a reload after a break d/t insurance concerns.      Assess patient for the concerns below. Document provider notification as appropriate.  - Active or recent infection with/without current antibiotic use: no  - Recent or planned invasive dental work: no  - Recent or planned surgeries: no  - Recently received or plans to receive vaccinations: no  - Has treatment related toxicities: no  - Is pregnant:  no      Pain: 6   - Is the pain different from normal: no   - Is  [Time Spent: ___ minutes] : I have spent [unfilled] minutes of time on the encounter. the pain tolerable: yes   - Is your Doctor aware:  yes      Labs:  POCT HCG negative          Fall Risk Screening: Bar Fall Risk  History of Falling, Immediate or Within 3 Months: No  Secondary Diagnosis: No  Ambulatory Aid: Crutches/cane/walker  Intravenous Therapy/Heparin Lock: No  Gait/Transferring: Weak  Mental Status: Oriented to own ability  Bar Fall Risk Score: 25       Review Of Systems:  Review of Systems   Musculoskeletal:  Positive for arthralgias and myalgias.   All other systems reviewed and are negative.        Infusion Readiness:   - Assessment Concerns Related to Infusion: No  - Provider notified: n/a      Document Below Only If Indicated:   New Patient Education:    N/A (returning patient for continuation of therapy. Ongoing education provided as needed.)        Treatment Conditions & Drug Specific Questions:    Golimumab  (SIMPONI)    (Unless otherwise specified on patient specific therapy plan):     TREATMENT CONDITIONS:  Unless otherwise specified on patient specific thearpy plan HOLD and notify provider prior to proceeding with today's infusion if patient with:  o Positive T-Spot  o Positive Hepatitis B Surface Ag    Lab Results   Component Value Date    TBGRES Negative  Reference range: NEGATIVE   10/14/2020    TSPOTR  09/12/2023     Negative  Reference range: Normal Value: Negative    A negative test result does not exclude the possibility of exposure  to   or infection with Mycobacterium tuberculosis (M. tuberculosis).    Patients with recent exposure to TB infected individuals exhibiting a   negative T-SPOT.TB result should be considered for retesting within 6   weeks or if other relevant clinical symptoms indicate.  Results from   T-SPOT.TB testing must be used in conjunction with each individual's   epidemiological history, current medical status, and results of other   diagnostic evaluations.  The T-SPOT.TB test is qualitative and  results   are reported as positive, borderline or  "negative, given that the test   controls perform as expected. In line with the Centers for Disease   Control and Prevention's 2010 recommendation to report quantitative   measurements alongside the qualitative result, the laboratory  provides   spot counts for informational purposes only.  The T-SPOT.TB test  should   not be interpreted as a quantitative test.  Test performed at:  [place] [address] [employer] [facility] [address]        Lab Results   Component Value Date    HEPBSAG NEGATIVE 10/14/2020      No results found for: \"NONUHFIRE\", \"NONUHSWGH\", \"NONUHFISH\", \"EXTHEPBSAG\"  Lab Results   Component Value Date    HEPCAB  10/14/2020     Negative  Reference range: NEGATIVE  Performed at the [facility] unless   otherwise noted.        Lab Results   Component Value Date    HEPCAB  10/14/2020     Negative  Reference range: NEGATIVE  Performed at the [facility] unless   otherwise noted.       No results found for: \"HBCTI\", \"HEPBCAB\"    Lab Results   Component Value Date    WBC 7.0 11/14/2023    HGB 12.1 11/14/2023    HCT 39.1 11/14/2023    MCV 85 11/14/2023     11/14/2023        Labs reviewed and patient meets treatment conditions? Yes    DRUG SPECIFIC QUESTIONS:   - Up to date on all immunizations? Yes    Immunization History   Administered Date(s) Administered    Flu vaccine (IIV4), preservative free *Check age/dose* 09/17/2020    Influenza, injectable, quadrivalent 10/14/2020    PPD Test 09/20/2019    Pneumococcal conjugate vaccine, 20-valent (PREVNAR 20) 06/21/2023    Td (adult) 02/26/2011    Tdap vaccine, age 7 year and older (BOOSTRIX, ADACEL) 09/17/2020, 11/10/2020         - Any history of or new or worsening s/s of heart failure? No  (If worsening s/s notify provider prior to proceeding. Simponi may cause   exacerbation of heart failure)     - Any history of cancer, especially lymphomas? No    (Box Warning: Malignancy)      REMINDER:  WEIGHT BASED " DRUG    Recommended Vitals/Observation:  Vitals: Take vital signs prior to starting infusion, at infusion conclusion and as needed.   Observation: No observation.        Weight Based Drug Calculations:    WEIGHT BASED DRUGS: Golimumab (SIMPONI)   Patient's dosing weight (kg): 74.8     10% weight variance for prescribed treatment: 67.4 kg to 82.2 kg     Patient's weight today:   Vitals:    04/11/24 1001   Weight: 75.3 kg (166 lb)         weight range for prescribed dose:     Patient weight today falls outside of 10% variance or  weight range: No     Home Care pharmacist informed of weight variance: Not applicable    Doses that are weight based have an acceptable variance rule within 10% of the prescribed   order and/or within  weight range. If patient weight on day of infusion falls   outside of the 10% variance, or weight range, infusion is administered and   pharmacy contacted regarding future dosing adjustments, per policy.         Initiated By: Jaelyn Aggarwal RN   Time: 10:53 AM     We administered golimumab (Simponi Aria) 150 mg in sodium chloride 0.9% 100 mL IV.    1110-IV line flushing with 30ml Normal Saline. Patient tolerated infusion well. No signs or symptoms of reaction noted.  1120-Initial IV site, patient states is no longer tender. The redness has reduced significantly since the IV was discontinued. Patient instructed to use heat pack PRN. Call if concerned, verified understanding

## 2024-05-07 ENCOUNTER — NON-APPOINTMENT (OUTPATIENT)
Age: 59
End: 2024-05-07

## 2024-05-07 ENCOUNTER — APPOINTMENT (OUTPATIENT)
Dept: CARDIOLOGY | Facility: CLINIC | Age: 59
End: 2024-05-07
Payer: COMMERCIAL

## 2024-05-07 VITALS
SYSTOLIC BLOOD PRESSURE: 140 MMHG | DIASTOLIC BLOOD PRESSURE: 72 MMHG | BODY MASS INDEX: 45.24 KG/M2 | OXYGEN SATURATION: 98 % | HEART RATE: 92 BPM | WEIGHT: 265 LBS | HEIGHT: 64 IN

## 2024-05-07 VITALS — SYSTOLIC BLOOD PRESSURE: 136 MMHG | DIASTOLIC BLOOD PRESSURE: 78 MMHG

## 2024-05-07 DIAGNOSIS — E05.90 THYROTOXICOSIS, UNSPECIFIED W/OUT THYROTOXIC CRISIS OR STORM: ICD-10-CM

## 2024-05-07 DIAGNOSIS — I48.91 UNSPECIFIED ATRIAL FIBRILLATION: ICD-10-CM

## 2024-05-07 DIAGNOSIS — E66.01 MORBID (SEVERE) OBESITY DUE TO EXCESS CALORIES: ICD-10-CM

## 2024-05-07 DIAGNOSIS — I50.33 ACUTE ON CHRONIC DIASTOLIC (CONGESTIVE) HEART FAILURE: ICD-10-CM

## 2024-05-07 PROCEDURE — 36415 COLL VENOUS BLD VENIPUNCTURE: CPT

## 2024-05-07 PROCEDURE — 93000 ELECTROCARDIOGRAM COMPLETE: CPT

## 2024-05-07 PROCEDURE — 99204 OFFICE O/P NEW MOD 45 MIN: CPT | Mod: 25

## 2024-05-07 RX ORDER — DILTIAZEM HYDROCHLORIDE 180 MG/1
180 CAPSULE, EXTENDED RELEASE ORAL DAILY
Qty: 90 | Refills: 3 | Status: ACTIVE | COMMUNITY
Start: 1900-01-01 | End: 1900-01-01

## 2024-05-07 RX ORDER — IBUPROFEN 800 MG/1
800 TABLET, FILM COATED ORAL
Refills: 0 | Status: DISCONTINUED | COMMUNITY
End: 2024-05-07

## 2024-05-07 RX ORDER — DIGOXIN 250 UG/1
250 TABLET ORAL DAILY
Qty: 90 | Refills: 1 | Status: ACTIVE | COMMUNITY
Start: 1900-01-01 | End: 1900-01-01

## 2024-05-07 RX ORDER — METHIMAZOLE 5 MG/1
5 TABLET ORAL
Refills: 0 | Status: ACTIVE | COMMUNITY

## 2024-05-07 RX ORDER — APIXABAN 5 MG/1
5 TABLET, FILM COATED ORAL
Qty: 180 | Refills: 3 | Status: ACTIVE | COMMUNITY
Start: 1900-01-01 | End: 1900-01-01

## 2024-05-07 RX ORDER — AMOXICILLIN 500 MG/1
500 CAPSULE ORAL
Refills: 0 | Status: DISCONTINUED | COMMUNITY
End: 2024-05-07

## 2024-05-07 NOTE — HISTORY OF PRESENT ILLNESS
[FreeTextEntry1] : TOPHER STRONG is a 58 year old female self referred.  Reports she did not have any cardiac issues prior to hospitalization, nor history of thyroid disease. In February became sick for 5 weeks, went to hospital, diagnosed with pneumonia, and tachycardia.  Admitted to [facility], treated with IV antibiotics.  Hosp 3/7-3/14. She was advised to follow up with cardiologist for some type of arrhythmia.  No prior cardiac history.  She reports she was told she was "on the borderline of heart failure." She feels better, went back to work. She takes a diuretic and blood thinner daily. No CP, SOB, palps, bleeding. She snores.  Reports having neg sleep study remotely.  Some daytime fatigue.  + daytime somnolence at home. She followed up with cardio on [address] once since hospitalization but does not want to go back because it is not convenient in location. She has chronic improved bilat leg edema.  Soc - non-smoker.  No alcohol use. Fam hist - non-contributory.

## 2024-05-07 NOTE — PHYSICAL EXAM
[Well Developed] : well developed [Well Nourished] : well nourished [No Acute Distress] : no acute distress [Obese] : obese [Normal Conjunctiva] : normal conjunctiva [Normal Venous Pressure] : normal venous pressure [No Carotid Bruit] : no carotid bruit [Normal S1, S2] : normal S1, S2 [No Murmur] : no murmur [No Rub] : no rub [No Gallop] : no gallop [Clear Lung Fields] : clear lung fields [Good Air Entry] : good air entry [No Respiratory Distress] : no respiratory distress  [Soft] : abdomen soft [Non Tender] : non-tender [No Masses/organomegaly] : no masses/organomegaly [Normal Bowel Sounds] : normal bowel sounds [Normal Gait] : normal gait [No Cyanosis] : no cyanosis [No Clubbing] : no clubbing [No Varicosities] : no varicosities [No Rash] : no rash [No Skin Lesions] : no skin lesions [Moves all extremities] : moves all extremities [No Focal Deficits] : no focal deficits [Normal Speech] : normal speech [Alert and Oriented] : alert and oriented [Normal memory] : normal memory [Edema ___] : edema [unfilled] [de-identified] : Irregular.

## 2024-05-07 NOTE — CARDIOLOGY SUMMARY
School official at  with patient [de-identified] : AF with mod VR, non-spec ST seg abn, possible dig effect.

## 2024-05-07 NOTE — DISCUSSION/SUMMARY
[FreeTextEntry1] : Request med rec from [facility]. Labs Refill meds. Sched echo 3-day monitor Sleep study Follow up 3 weeks. She is not willing to sched sleep study or do monitoring at this time. Explained pathophysiology, natural history and goals of management. She does compl of persistent fatigue since hosp which could be related to AF and I recommended EP eval for possible RFA.   [EKG obtained to assist in diagnosis and management of assessed problem(s)] : EKG obtained to assist in diagnosis and management of assessed problem(s)

## 2024-05-09 LAB
ANION GAP SERPL CALC-SCNC: 15 MMOL/L
BUN SERPL-MCNC: 13 MG/DL
CALCIUM SERPL-MCNC: 9.1 MG/DL
CHLORIDE SERPL-SCNC: 104 MMOL/L
CHOLEST SERPL-MCNC: 173 MG/DL
CO2 SERPL-SCNC: 21 MMOL/L
CREAT SERPL-MCNC: 0.71 MG/DL
DIGOXIN SERPL-MCNC: 1.1 NG/ML
EGFR: 98 ML/MIN/1.73M2
GLUCOSE SERPL-MCNC: 147 MG/DL
HCT VFR BLD CALC: 40.1 %
HDLC SERPL-MCNC: 33 MG/DL
HGB BLD-MCNC: 12.6 G/DL
LDLC SERPL CALC-MCNC: 116 MG/DL
MCHC RBC-ENTMCNC: 29.4 PG
MCHC RBC-ENTMCNC: 31.4 GM/DL
MCV RBC AUTO: 93.7 FL
NONHDLC SERPL-MCNC: 140 MG/DL
NT-PROBNP SERPL-MCNC: 907 PG/ML
PLATELET # BLD AUTO: 202 K/UL
POTASSIUM SERPL-SCNC: 3.6 MMOL/L
RBC # BLD: 4.28 M/UL
RBC # FLD: 16.5 %
SODIUM SERPL-SCNC: 140 MMOL/L
T4 SERPL-MCNC: 9.6 UG/DL
TRIGL SERPL-MCNC: 136 MG/DL
TSH SERPL-ACNC: 1.45 UIU/ML
WBC # FLD AUTO: 13.07 K/UL

## 2024-05-09 RX ORDER — FUROSEMIDE 20 MG/1
20 TABLET ORAL DAILY
Qty: 90 | Refills: 0 | Status: ACTIVE | COMMUNITY
Start: 1900-01-01 | End: 1900-01-01

## 2024-05-14 RX ORDER — METOPROLOL TARTRATE 50 MG/1
50 TABLET, FILM COATED ORAL TWICE DAILY
Qty: 180 | Refills: 3 | Status: ACTIVE | COMMUNITY
Start: 1900-01-01 | End: 1900-01-01

## 2024-07-11 ENCOUNTER — APPOINTMENT (OUTPATIENT)
Dept: CARDIOLOGY | Facility: CLINIC | Age: 59
End: 2024-07-11
Payer: COMMERCIAL

## 2024-07-11 ENCOUNTER — NON-APPOINTMENT (OUTPATIENT)
Age: 59
End: 2024-07-11

## 2024-07-11 ENCOUNTER — APPOINTMENT (OUTPATIENT)
Dept: ELECTROPHYSIOLOGY | Facility: CLINIC | Age: 59
End: 2024-07-11
Payer: COMMERCIAL

## 2024-07-11 VITALS
HEIGHT: 64 IN | OXYGEN SATURATION: 98 % | WEIGHT: 249 LBS | DIASTOLIC BLOOD PRESSURE: 70 MMHG | SYSTOLIC BLOOD PRESSURE: 128 MMHG | HEART RATE: 90 BPM | BODY MASS INDEX: 42.51 KG/M2

## 2024-07-11 DIAGNOSIS — Z86.79 PERSONAL HISTORY OF OTHER DISEASES OF THE CIRCULATORY SYSTEM: ICD-10-CM

## 2024-07-11 DIAGNOSIS — E66.01 MORBID (SEVERE) OBESITY DUE TO EXCESS CALORIES: ICD-10-CM

## 2024-07-11 DIAGNOSIS — I48.19 OTHER PERSISTENT ATRIAL FIBRILLATION: ICD-10-CM

## 2024-07-11 DIAGNOSIS — I50.32 CHRONIC DIASTOLIC (CONGESTIVE) HEART FAILURE: ICD-10-CM

## 2024-07-11 DIAGNOSIS — I50.33 ACUTE ON CHRONIC DIASTOLIC (CONGESTIVE) HEART FAILURE: ICD-10-CM

## 2024-07-11 PROCEDURE — 93306 TTE W/DOPPLER COMPLETE: CPT

## 2024-07-11 PROCEDURE — 99213 OFFICE O/P EST LOW 20 MIN: CPT | Mod: 25

## 2024-07-11 PROCEDURE — 99204 OFFICE O/P NEW MOD 45 MIN: CPT | Mod: 25

## 2024-07-11 PROCEDURE — 93000 ELECTROCARDIOGRAM COMPLETE: CPT

## 2024-08-05 ENCOUNTER — RX RENEWAL (OUTPATIENT)
Age: 59
End: 2024-08-05

## 2024-09-22 ENCOUNTER — NON-APPOINTMENT (OUTPATIENT)
Age: 59
End: 2024-09-22

## 2024-09-23 ENCOUNTER — APPOINTMENT (OUTPATIENT)
Dept: CARDIOLOGY | Facility: CLINIC | Age: 59
End: 2024-09-23
Payer: COMMERCIAL

## 2024-09-23 VITALS
SYSTOLIC BLOOD PRESSURE: 128 MMHG | OXYGEN SATURATION: 98 % | BODY MASS INDEX: 40.29 KG/M2 | WEIGHT: 236 LBS | DIASTOLIC BLOOD PRESSURE: 80 MMHG | HEIGHT: 64 IN | HEART RATE: 83 BPM

## 2024-09-23 DIAGNOSIS — I50.32 CHRONIC DIASTOLIC (CONGESTIVE) HEART FAILURE: ICD-10-CM

## 2024-09-23 DIAGNOSIS — I48.19 OTHER PERSISTENT ATRIAL FIBRILLATION: ICD-10-CM

## 2024-09-23 PROCEDURE — 99214 OFFICE O/P EST MOD 30 MIN: CPT

## 2024-09-23 NOTE — DISCUSSION/SUMMARY
[FreeTextEntry1] : Recommended EP follow up to discuss ablation. Contin current meds incl rate control meds and anticoagulation. Follow up 3 mo

## 2024-09-23 NOTE — PHYSICAL EXAM
[Well Developed] : well developed [Well Nourished] : well nourished [No Acute Distress] : no acute distress [Obese] : obese [Normal Conjunctiva] : normal conjunctiva [Normal Venous Pressure] : normal venous pressure [No Carotid Bruit] : no carotid bruit [Normal S1, S2] : normal S1, S2 [No Murmur] : no murmur [No Rub] : no rub [No Gallop] : no gallop [Clear Lung Fields] : clear lung fields [Good Air Entry] : good air entry [No Respiratory Distress] : no respiratory distress  [Soft] : abdomen soft [Non Tender] : non-tender [No Masses/organomegaly] : no masses/organomegaly [Normal Bowel Sounds] : normal bowel sounds [Normal Gait] : normal gait [No Edema] : no edema [No Cyanosis] : no cyanosis [No Clubbing] : no clubbing [No Varicosities] : no varicosities [No Rash] : no rash [No Skin Lesions] : no skin lesions [Moves all extremities] : moves all extremities [No Focal Deficits] : no focal deficits [Normal Speech] : normal speech [Alert and Oriented] : alert and oriented [Normal memory] : normal memory [de-identified] : Irregular

## 2024-09-23 NOTE — HISTORY OF PRESENT ILLNESS
[FreeTextEntry1] : Here for follow up. No new complaints. Thyroid biopsy neg. She is unsure if she wants to proceed with afib ablation. No bleeding.

## 2024-10-29 ENCOUNTER — RX RENEWAL (OUTPATIENT)
Age: 59
End: 2024-10-29

## 2025-01-02 ENCOUNTER — APPOINTMENT (OUTPATIENT)
Dept: CARDIOLOGY | Facility: CLINIC | Age: 60
End: 2025-01-02

## 2025-01-22 ENCOUNTER — APPOINTMENT (OUTPATIENT)
Dept: CARDIOLOGY | Facility: CLINIC | Age: 60
End: 2025-01-22
Payer: COMMERCIAL

## 2025-01-22 ENCOUNTER — NON-APPOINTMENT (OUTPATIENT)
Age: 60
End: 2025-01-22

## 2025-01-22 VITALS
HEART RATE: 113 BPM | DIASTOLIC BLOOD PRESSURE: 80 MMHG | WEIGHT: 234 LBS | HEIGHT: 64 IN | SYSTOLIC BLOOD PRESSURE: 132 MMHG | BODY MASS INDEX: 39.95 KG/M2 | OXYGEN SATURATION: 99 %

## 2025-01-22 DIAGNOSIS — I50.32 CHRONIC DIASTOLIC (CONGESTIVE) HEART FAILURE: ICD-10-CM

## 2025-01-22 DIAGNOSIS — R06.00 DYSPNEA, UNSPECIFIED: ICD-10-CM

## 2025-01-22 DIAGNOSIS — I48.19 OTHER PERSISTENT ATRIAL FIBRILLATION: ICD-10-CM

## 2025-01-22 PROCEDURE — 93000 ELECTROCARDIOGRAM COMPLETE: CPT | Mod: 59

## 2025-01-22 PROCEDURE — 99214 OFFICE O/P EST MOD 30 MIN: CPT

## 2025-01-22 PROCEDURE — 36415 COLL VENOUS BLD VENIPUNCTURE: CPT

## 2025-01-22 PROCEDURE — 93242 EXT ECG>48HR<7D RECORDING: CPT

## 2025-01-23 LAB
ANION GAP SERPL CALC-SCNC: 20 MMOL/L
BUN SERPL-MCNC: 19 MG/DL
CALCIUM SERPL-MCNC: 10 MG/DL
CHLORIDE SERPL-SCNC: 98 MMOL/L
CO2 SERPL-SCNC: 20 MMOL/L
CREAT SERPL-MCNC: 1 MG/DL
DIGOXIN SERPL-MCNC: 1.2 NG/ML
EGFR: 65 ML/MIN/1.73M2
GLUCOSE SERPL-MCNC: 95 MG/DL
HCT VFR BLD CALC: 48.4 %
HGB BLD-MCNC: 15.7 G/DL
MCHC RBC-ENTMCNC: 29.9 PG
MCHC RBC-ENTMCNC: 32.4 G/DL
MCV RBC AUTO: 92.2 FL
NT-PROBNP SERPL-MCNC: 424 PG/ML
PLATELET # BLD AUTO: 227 K/UL
POTASSIUM SERPL-SCNC: 4 MMOL/L
RBC # BLD: 5.25 M/UL
RBC # FLD: 14.3 %
SODIUM SERPL-SCNC: 138 MMOL/L
WBC # FLD AUTO: 18.61 K/UL

## 2025-01-29 ENCOUNTER — APPOINTMENT (OUTPATIENT)
Dept: CARDIOLOGY | Facility: CLINIC | Age: 60
End: 2025-01-29
Payer: COMMERCIAL

## 2025-01-29 PROCEDURE — 36415 COLL VENOUS BLD VENIPUNCTURE: CPT

## 2025-01-31 LAB
CHOLEST SERPL-MCNC: 134 MG/DL
HCT VFR BLD CALC: 47 %
HDLC SERPL-MCNC: 37 MG/DL
HGB BLD-MCNC: 14.7 G/DL
LDLC SERPL CALC-MCNC: 79 MG/DL
MCHC RBC-ENTMCNC: 29.8 PG
MCHC RBC-ENTMCNC: 31.3 G/DL
MCV RBC AUTO: 95.3 FL
NONHDLC SERPL-MCNC: 97 MG/DL
PLATELET # BLD AUTO: 205 K/UL
RBC # BLD: 4.93 M/UL
RBC # FLD: 14.7 %
TRIGL SERPL-MCNC: 97 MG/DL
WBC # FLD AUTO: 14.45 K/UL

## 2025-02-07 ENCOUNTER — NON-APPOINTMENT (OUTPATIENT)
Age: 60
End: 2025-02-07

## 2025-02-07 PROCEDURE — 93244 EXT ECG>48HR<7D REV&INTERPJ: CPT

## 2025-02-10 ENCOUNTER — NON-APPOINTMENT (OUTPATIENT)
Age: 60
End: 2025-02-10

## 2025-03-11 ENCOUNTER — NON-APPOINTMENT (OUTPATIENT)
Age: 60
End: 2025-03-11

## 2025-03-11 ENCOUNTER — APPOINTMENT (OUTPATIENT)
Dept: ELECTROPHYSIOLOGY | Facility: CLINIC | Age: 60
End: 2025-03-11
Payer: COMMERCIAL

## 2025-03-11 VITALS
WEIGHT: 230 LBS | DIASTOLIC BLOOD PRESSURE: 70 MMHG | OXYGEN SATURATION: 99 % | SYSTOLIC BLOOD PRESSURE: 120 MMHG | BODY MASS INDEX: 39.27 KG/M2 | HEART RATE: 81 BPM | HEIGHT: 64 IN

## 2025-03-11 DIAGNOSIS — I48.19 OTHER PERSISTENT ATRIAL FIBRILLATION: ICD-10-CM

## 2025-03-11 PROCEDURE — G2211 COMPLEX E/M VISIT ADD ON: CPT

## 2025-03-11 PROCEDURE — 93000 ELECTROCARDIOGRAM COMPLETE: CPT

## 2025-03-11 PROCEDURE — 99215 OFFICE O/P EST HI 40 MIN: CPT

## 2025-03-25 RX ORDER — EMPAGLIFLOZIN AND METFORMIN HYDROCHLORIDE 12.5; 1 MG/1; MG/1
12.5-1 TABLET ORAL
Refills: 0 | Status: ACTIVE | COMMUNITY
Start: 2025-03-25

## 2025-05-23 ENCOUNTER — APPOINTMENT (OUTPATIENT)
Dept: CARDIOLOGY | Facility: CLINIC | Age: 60
End: 2025-05-23
Payer: COMMERCIAL

## 2025-05-23 VITALS
HEIGHT: 64 IN | WEIGHT: 226 LBS | SYSTOLIC BLOOD PRESSURE: 114 MMHG | BODY MASS INDEX: 38.58 KG/M2 | HEART RATE: 89 BPM | OXYGEN SATURATION: 98 % | DIASTOLIC BLOOD PRESSURE: 72 MMHG

## 2025-05-23 DIAGNOSIS — I48.19 OTHER PERSISTENT ATRIAL FIBRILLATION: ICD-10-CM

## 2025-05-23 DIAGNOSIS — I49.5 SICK SINUS SYNDROME: ICD-10-CM

## 2025-05-23 DIAGNOSIS — I50.32 CHRONIC DIASTOLIC (CONGESTIVE) HEART FAILURE: ICD-10-CM

## 2025-05-23 PROCEDURE — 99213 OFFICE O/P EST LOW 20 MIN: CPT

## 2025-08-01 ENCOUNTER — OUTPATIENT (OUTPATIENT)
Dept: OUTPATIENT SERVICES | Facility: HOSPITAL | Age: 60
LOS: 1 days | End: 2025-08-01

## 2025-08-01 VITALS
OXYGEN SATURATION: 99 % | HEART RATE: 72 BPM | TEMPERATURE: 98 F | DIASTOLIC BLOOD PRESSURE: 71 MMHG | SYSTOLIC BLOOD PRESSURE: 140 MMHG | HEIGHT: 63 IN | WEIGHT: 225.09 LBS | RESPIRATION RATE: 16 BRPM

## 2025-08-01 DIAGNOSIS — E05.90 THYROTOXICOSIS, UNSPECIFIED WITHOUT THYROTOXIC CRISIS OR STORM: ICD-10-CM

## 2025-08-01 DIAGNOSIS — I48.91 UNSPECIFIED ATRIAL FIBRILLATION: ICD-10-CM

## 2025-08-01 DIAGNOSIS — E11.9 TYPE 2 DIABETES MELLITUS WITHOUT COMPLICATIONS: ICD-10-CM

## 2025-08-01 DIAGNOSIS — I48.0 PAROXYSMAL ATRIAL FIBRILLATION: ICD-10-CM

## 2025-08-01 DIAGNOSIS — N83.209 UNSPECIFIED OVARIAN CYST, UNSPECIFIED SIDE: Chronic | ICD-10-CM

## 2025-08-01 LAB
ANION GAP SERPL CALC-SCNC: 13 MMOL/L — SIGNIFICANT CHANGE UP (ref 7–14)
BLD GP AB SCN SERPL QL: NEGATIVE — SIGNIFICANT CHANGE UP
BUN SERPL-MCNC: 16 MG/DL — SIGNIFICANT CHANGE UP (ref 7–23)
CALCIUM SERPL-MCNC: 9.4 MG/DL — SIGNIFICANT CHANGE UP (ref 8.4–10.5)
CHLORIDE SERPL-SCNC: 100 MMOL/L — SIGNIFICANT CHANGE UP (ref 98–107)
CO2 SERPL-SCNC: 24 MMOL/L — SIGNIFICANT CHANGE UP (ref 22–31)
CREAT SERPL-MCNC: 0.76 MG/DL — SIGNIFICANT CHANGE UP (ref 0.5–1.3)
EGFR: 90 ML/MIN/1.73M2 — SIGNIFICANT CHANGE UP
EGFR: 90 ML/MIN/1.73M2 — SIGNIFICANT CHANGE UP
GLUCOSE SERPL-MCNC: 148 MG/DL — HIGH (ref 70–99)
POTASSIUM SERPL-MCNC: 3.8 MMOL/L — SIGNIFICANT CHANGE UP (ref 3.5–5.3)
POTASSIUM SERPL-SCNC: 3.8 MMOL/L — SIGNIFICANT CHANGE UP (ref 3.5–5.3)
RH IG SCN BLD-IMP: NEGATIVE — SIGNIFICANT CHANGE UP
SODIUM SERPL-SCNC: 137 MMOL/L — SIGNIFICANT CHANGE UP (ref 135–145)

## 2025-08-02 LAB
BASOPHILS # BLD AUTO: 0.07 K/UL — SIGNIFICANT CHANGE UP (ref 0–0.2)
BASOPHILS NFR BLD AUTO: 0.5 % — SIGNIFICANT CHANGE UP (ref 0–2)
EOSINOPHIL # BLD AUTO: 0.11 K/UL — SIGNIFICANT CHANGE UP (ref 0–0.5)
EOSINOPHIL NFR BLD AUTO: 0.8 % — SIGNIFICANT CHANGE UP (ref 0–6)
HCT VFR BLD CALC: 43 % — SIGNIFICANT CHANGE UP (ref 34.5–45)
HGB BLD-MCNC: 13.9 G/DL — SIGNIFICANT CHANGE UP (ref 11.5–15.5)
IMM GRANULOCYTES NFR BLD AUTO: 0.2 % — SIGNIFICANT CHANGE UP (ref 0–0.9)
LYMPHOCYTES # BLD AUTO: 17.7 % — SIGNIFICANT CHANGE UP (ref 13–44)
LYMPHOCYTES # BLD AUTO: 2.42 K/UL — SIGNIFICANT CHANGE UP (ref 1–3.3)
MCHC RBC-ENTMCNC: 29.4 PG — SIGNIFICANT CHANGE UP (ref 27–34)
MCHC RBC-ENTMCNC: 32.3 G/DL — SIGNIFICANT CHANGE UP (ref 32–36)
MCV RBC AUTO: 91.1 FL — SIGNIFICANT CHANGE UP (ref 80–100)
MONOCYTES # BLD AUTO: 0.58 K/UL — SIGNIFICANT CHANGE UP (ref 0–0.9)
MONOCYTES NFR BLD AUTO: 4.2 % — SIGNIFICANT CHANGE UP (ref 2–14)
NEUTROPHILS # BLD AUTO: 10.46 K/UL — HIGH (ref 1.8–7.4)
NEUTROPHILS NFR BLD AUTO: 76.6 % — SIGNIFICANT CHANGE UP (ref 43–77)
PLATELET # BLD AUTO: 216 K/UL — SIGNIFICANT CHANGE UP (ref 150–400)
RBC # BLD: 4.72 M/UL — SIGNIFICANT CHANGE UP (ref 3.8–5.2)
RBC # FLD: 13.8 % — SIGNIFICANT CHANGE UP (ref 10.3–14.5)
WBC # BLD: 13.67 K/UL — HIGH (ref 3.8–10.5)
WBC # FLD AUTO: 13.67 K/UL — HIGH (ref 3.8–10.5)

## 2025-08-06 ENCOUNTER — RX RENEWAL (OUTPATIENT)
Age: 60
End: 2025-08-06

## 2025-08-11 ENCOUNTER — TRANSCRIPTION ENCOUNTER (OUTPATIENT)
Age: 60
End: 2025-08-11

## 2025-08-11 ENCOUNTER — OUTPATIENT (OUTPATIENT)
Dept: OUTPATIENT SERVICES | Facility: HOSPITAL | Age: 60
LOS: 1 days | End: 2025-08-11
Payer: COMMERCIAL

## 2025-08-11 VITALS
WEIGHT: 222.01 LBS | OXYGEN SATURATION: 99 % | TEMPERATURE: 98 F | RESPIRATION RATE: 17 BRPM | SYSTOLIC BLOOD PRESSURE: 119 MMHG | HEART RATE: 72 BPM | DIASTOLIC BLOOD PRESSURE: 74 MMHG | HEIGHT: 64 IN

## 2025-08-11 VITALS
RESPIRATION RATE: 19 BRPM | OXYGEN SATURATION: 99 % | HEART RATE: 79 BPM | DIASTOLIC BLOOD PRESSURE: 59 MMHG | SYSTOLIC BLOOD PRESSURE: 108 MMHG

## 2025-08-11 DIAGNOSIS — I48.0 PAROXYSMAL ATRIAL FIBRILLATION: ICD-10-CM

## 2025-08-11 DIAGNOSIS — N83.209 UNSPECIFIED OVARIAN CYST, UNSPECIFIED SIDE: Chronic | ICD-10-CM

## 2025-08-11 LAB — GLUCOSE BLDC GLUCOMTR-MCNC: 126 MG/DL — HIGH (ref 70–99)

## 2025-08-11 PROCEDURE — 93010 ELECTROCARDIOGRAM REPORT: CPT

## 2025-08-11 PROCEDURE — 93656 COMPRE EP EVAL ABLTJ ATR FIB: CPT

## 2025-08-11 PROCEDURE — 93657 TX L/R ATRIAL FIB ADDL: CPT

## 2025-08-11 PROCEDURE — 93655 ICAR CATH ABLTJ DSCRT ARRHYT: CPT

## 2025-08-11 PROCEDURE — 93010 ELECTROCARDIOGRAM REPORT: CPT | Mod: 77

## 2025-08-11 RX ORDER — ACETAMINOPHEN 500 MG/5ML
1000 LIQUID (ML) ORAL ONCE
Refills: 0 | Status: COMPLETED | OUTPATIENT
Start: 2025-08-11 | End: 2025-08-11

## 2025-08-11 RX ORDER — FLUTICASONE FUROATE AND VILANTEROL TRIFENATATE 100; 25 UG/1; UG/1
1 POWDER RESPIRATORY (INHALATION)
Refills: 0 | DISCHARGE

## 2025-08-11 RX ORDER — FUROSEMIDE 10 MG/ML
1 INJECTION INTRAMUSCULAR; INTRAVENOUS
Refills: 0 | DISCHARGE

## 2025-08-11 RX ORDER — DIGOXIN 250 UG/1
1 TABLET ORAL
Refills: 0 | DISCHARGE

## 2025-08-11 RX ORDER — DILTIAZEM HYDROCHLORIDE 240 MG/1
1 TABLET, EXTENDED RELEASE ORAL
Refills: 0 | DISCHARGE

## 2025-08-11 RX ORDER — ATORVASTATIN CALCIUM 80 MG/1
1 TABLET, FILM COATED ORAL
Refills: 0 | DISCHARGE

## 2025-08-11 RX ORDER — EMPAGLIFLOZIN AND METFORMIN HYDROCHLORIDE 12.5; 1 MG/1; MG/1
1 TABLET ORAL
Qty: 0 | Refills: 0 | DISCHARGE

## 2025-08-11 RX ORDER — APIXABAN 5 MG/1
1 TABLET, FILM COATED ORAL
Qty: 0 | Refills: 0 | DISCHARGE

## 2025-08-11 RX ORDER — METHIMAZOLE 5 MG
1 TABLET ORAL
Refills: 0 | DISCHARGE

## 2025-08-11 RX ADMIN — Medication 1000 MILLIGRAM(S): at 14:40

## 2025-08-11 RX ADMIN — Medication 400 MILLIGRAM(S): at 14:40

## 2025-09-17 ENCOUNTER — APPOINTMENT (OUTPATIENT)
Dept: ELECTROPHYSIOLOGY | Facility: CLINIC | Age: 60
End: 2025-09-17
Payer: COMMERCIAL

## 2025-09-17 ENCOUNTER — NON-APPOINTMENT (OUTPATIENT)
Age: 60
End: 2025-09-17

## 2025-09-17 VITALS
TEMPERATURE: 98.2 F | RESPIRATION RATE: 15 BRPM | DIASTOLIC BLOOD PRESSURE: 75 MMHG | OXYGEN SATURATION: 98 % | SYSTOLIC BLOOD PRESSURE: 126 MMHG | HEART RATE: 58 BPM | WEIGHT: 220.13 LBS | BODY MASS INDEX: 37.58 KG/M2 | HEIGHT: 64 IN

## 2025-09-17 DIAGNOSIS — Z98.890 OTHER SPECIFIED POSTPROCEDURAL STATES: ICD-10-CM

## 2025-09-17 DIAGNOSIS — I48.91 UNSPECIFIED ATRIAL FIBRILLATION: ICD-10-CM

## 2025-09-17 DIAGNOSIS — Z86.79 OTHER SPECIFIED POSTPROCEDURAL STATES: ICD-10-CM

## 2025-09-17 DIAGNOSIS — I50.32 CHRONIC DIASTOLIC (CONGESTIVE) HEART FAILURE: ICD-10-CM

## 2025-09-17 PROCEDURE — 93000 ELECTROCARDIOGRAM COMPLETE: CPT

## 2025-09-17 PROCEDURE — G2211 COMPLEX E/M VISIT ADD ON: CPT

## 2025-09-17 PROCEDURE — 99214 OFFICE O/P EST MOD 30 MIN: CPT
